# Patient Record
Sex: FEMALE | Race: BLACK OR AFRICAN AMERICAN | Employment: FULL TIME | URBAN - METROPOLITAN AREA
[De-identification: names, ages, dates, MRNs, and addresses within clinical notes are randomized per-mention and may not be internally consistent; named-entity substitution may affect disease eponyms.]

---

## 2017-12-11 ENCOUNTER — HOSPITAL ENCOUNTER (OUTPATIENT)
Dept: PHYSICAL THERAPY | Age: 42
Discharge: HOME | End: 2017-12-11
Attending: FAMILY MEDICINE
Payer: OTHER GOVERNMENT

## 2017-12-11 DIAGNOSIS — M54.50 CHRONIC RIGHT-SIDED LOW BACK PAIN WITHOUT SCIATICA: ICD-10-CM

## 2017-12-11 DIAGNOSIS — G89.29 CHRONIC RIGHT-SIDED LOW BACK PAIN WITHOUT SCIATICA: ICD-10-CM

## 2017-12-11 DIAGNOSIS — M25.551 RIGHT HIP PAIN: ICD-10-CM

## 2017-12-11 PROCEDURE — 97110 THERAPEUTIC EXERCISES: CPT

## 2017-12-11 PROCEDURE — 97014 ELECTRIC STIMULATION THERAPY: CPT

## 2017-12-11 PROCEDURE — 97161 PT EVAL LOW COMPLEX 20 MIN: CPT

## 2017-12-11 NOTE — THERAPY EVALUATION
Chicho Gossemann  : 1975   Payor: Nitish Torres / [place]  ACTIVE DUTY / Product Type:  /    [address]  at [facility]  [address], [address], [place]  [phone]   [phone]        OUTPATIENT PHYSICAL THERAPY:Initial Assessment 2017   ICD-10: Treatment Diagnosis: Low back pain (M54.5), Pain in left hip (M25.552), Pain in right hip (M25.551)  Precautions/Allergies:   Review of patient's allergies indicates no known allergies. Fall Risk Score: 0 (? 5 = High Risk)  MD Orders: evaluate and treat MEDICAL/REFERRING DIAGNOSIS:  Chronic right-sided low back pain without sciatica [M54.5, G89.29]  Right hip pain [M25.551]   DATE OF ONSET: --  REFERRING PHYSICIAN: DO MONIQUE Reyes PHYSICIAN APPOINTMENT: -     INITIAL ASSESSMENT:  Ms. Jose Manuel Yang presents with low back and hip pain. Pt will benefit from skilled physical therapy to address dysfunctions and pain. PROBLEM LIST (Impacting functional limitations):  1. Decreased ADL/Functional Activities  2. Increased Pain  3. Decreased [place] with Home Exercise Program INTERVENTIONS PLANNED:  1. Home Exercise Program (HEP)  2. Manual Therapy including joint and soft tissue manipulation and mobilization, and dry needling  3. Therapeutic Exercise/Strengthening  4. Modalities including heat/cold application and electrical stimulation   TREATMENT PLAN:  Effective Dates: 17 TO 18. Frequency/Duration: 1-2 times a week for 4 weeks  GOALS: (Goals have been discussed and agreed upon with patient.)  Short-Term Functional Goals: Time Frame: 4 weeks  1. Pt will be independent with > or = 3 exercises in HEP. Discharge Goals: Time Frame: 8 weeks  1. Pt will be independent with > or = 4 exercises in HEP. 2. Pt will report > or = \"moderately better\" on GROC.     Rehabilitation Potential For Stated Goals: EXCELLENT  Regarding Sonia Harrison's therapy, I certify that the treatment plan above will be carried out by a therapist or under their direction. Thank you for this referral,  Umair Grace, PT, DPT               The information in this section was collected on 12/11/17 (except where otherwise noted). HISTORY:   History of Present Injury/Illness (Reason for Referral):  Pt reports chronic low back pain and bilateral hip pain worse on the right that has been getting worse the last 2 months. Made worse by running, prolonged standing. Made better by stretching, massage. Has been diagnosed in the past with scoliosis. No recent radiographs. Past Medical History/Comorbidities:   Ms. Mirian Cano  has a past medical history of Acne; Gestational diabetes; Hypertension; Insomnia with sleep apnea; Low testosterone level in female; Menopausal syndrome; Mixed hyperlipidemia; and Vitamin D deficiency (11/05/2013). Ms. Mirian Cano  has no past surgical history on file. Social History/Living Environment:     house  Prior Level of Function/Work/Activity:   full time  Dominant Side:         RIGHT    Current Medications:       Current Outpatient Prescriptions:     progesterone micronized 4 % vaginal gel, Insert 45 mg into vagina every other day for 90 days. , Disp: 45 Tube, Rfl: 11    atorvastatin (LIPITOR) 10 mg tablet, Take 1 Tab by mouth daily. , Disp: 90 Tab, Rfl: 3    testosterone (TESTIM) 50 mg/5 gram (1 %) gel, 0.1 g by TransDERmal route daily. Max Daily Amount: 0.1 g., Disp: 30 g, Rfl: 5    cholecalciferol (VITAMIN D3) 1,000 unit cap, Take 1,000 Units by mouth daily. Indications: OTC, Disp: , Rfl:     flaxseed oil 1,000 mg cap, Take 1 Cap by mouth daily. Indications: OTC, Disp: , Rfl:    Date Last Reviewed:  12/11/2017     Number of Personal Factors/Comorbidities that affect the Plan of Care: 0: LOW COMPLEXITY   EXAMINATION:   Observation/Orthostatic Postural Assessment:          In standing patient has increased lumbar lordosis.    Palpation:          Increased tenderness to PAs of low back and posterior hips especially over the PSIS and the SI joints. ROM:          Grossly functional ROM. Decreased low back flexion movement. Slightly decreased R hip IR compared to L. Strength:          Decreased core strength        Hip abduction L 4/5, R 3+/5        Hip extension L 3/5, R 3/5       Hip flexion L 5/5, R 4/5  Balance:          No functional deficits noted  SFMA Top Tier (FN = Functional and Non-painful, FP = Functional and Painful, DP = Dysfunctional and Painful, DN = Dysfunctional and Non-painful)  Cervical flexion: FN  Cervical extension: FN   Cervical rotation: L FN, R FN  Upper Extremity Pattern 1 (extension, IR): L FN, R FN  Upper Extremity Pattern 2 (flexion, ER): L FN, R FN  Multi-segmental flexion: DP  Multi-segmental extension: DP  Multi-segmental rotation: L FP, R FP  Single-Leg Stance: L DN, R DN  Overhead Deep Squat: DN   Summary: Pain elicited with low back flexion and extension, most limited in these movements. Back rotation functional range but end range elicited pain. Body Structures Involved:  1. Nerves  2. Bones  3. Joints  4. Muscles Body Functions Affected:  1. Sensory/Pain  2. Neuromusculoskeletal  3. Movement Related Activities and Participation Affected:  1. General Tasks and Demands  2. Mobility  3. Community, Social and [place]   Number of elements (examined above) that affect the Plan of Care: 1-2: LOW COMPLEXITY   CLINICAL PRESENTATION:   Presentation: Stable and uncomplicated: LOW COMPLEXITY   CLINICAL DECISION MAKING:   Outcome Measure: Tool Used: Modified Oswestry Low Back Pain Questionnaire  Score:  Initial: 5/50  Most Recent: X/50 (Date: -- )   Interpretation of Score: Each section is scored on a 0-5 scale, 5 representing the greatest disability. The scores of each section are added together for a total score of 50.     Score 0 1-10 11-20 21-30 31-40 41-49 50   Modifier CH CI CJ CK CL CM CN     Medical Necessity:   · Skilled intervention continues to be required due to decreased function. Reason for Services/Other Comments:  · Patient continues to require skilled intervention due to decreased function. Use of outcome tool(s) and clinical judgement create a POC that gives a: Clear prediction of patient's progress: LOW COMPLEXITY            TREATMENT:   (In addition to Assessment/Re-Assessment sessions the following treatments were rendered)  Pre-treatment Symptoms/Complaints:  Low level dull ache in bilateral hips and low back  Pain: Initial:     3/10 Post Session:  0/10     THERAPEUTIC EXERCISE: (15 minutes):  Exercises per grid below to improve mobility and strength. Date:  12/11 Date:   Date:     Activity/Exercise Parameters Parameters Parameters         Piriformis stretch 20 sec hold x 3      SL hip abduction 2 x 10                                 MANUAL THERAPY: ( minutes): To increase motion and reduce pain    MODALITIES: (12 minutes):   - IFC electrical stimulation to low back/SI region, intensity adjusted to patient tolerance    Treatment/Session Assessment:    · Response to Treatment:  Pt reported reduction in pain to 0/10 after exercises, and reported the IFC felt very good. · Compliance with Program/Exercises: Will assess as treatment progresses. · Recommendations/Intent for next treatment session: \"Next visit will focus on advancements to more challenging activities\".   Total Treatment Duration:  PT Patient Time In/Time Out  Time In: 0915  Time Out: 1010    Future Appointments  Date Time Provider Zeeshan Carpenter   12/14/2017 9:45 AM Reuben Lundberg PT [facility]   12/18/2017 3:15 PM [facility]   12/20/2017 3:15 PM Leyla Gamino PT, DPT [facility]   12/27/2017 11:15 AM Leyla Gamino PT, DPT [facility]   12/29/2017 9:15 AM Leyla Gamino PT DPT [facility]   1/3/2018 8:30 AM Leyla Gamino PT DPMARIA M [facility]   1/5/2018 8:30 AM Leyla Gamino PT, DPT [facility]   1/15/2018 10:30 AM PST LAB [facility] PST PST   1/29/2018 9:10 AM DO ANGELICA Prado PST PST       Camden Valenzuela, PT, DPT

## 2017-12-11 NOTE — PROGRESS NOTES
Ambulatory/Rehab Services H2 Model Falls Risk Assessment    Risk Factor Pts. ·   Confusion/Disorientation/Impulsivity  []    4 ·   Symptomatic Depression  []   2 ·   Altered Elimination  []   1 ·   Dizziness/Vertigo  []   1 ·   Gender (Male)  []   1 ·   Any administered antiepileptics (anticonvulsants):  []   2 ·   Any administered benzodiazepines:  []   1 ·   Visual Impairment (specify):  []   1 ·   Portable Oxygen Use  []   1 ·   Orthostatic ? BP  []   1 ·   History of Recent Falls (within 3 mos.)  []   5     Ability to Rise from Chair (choose one) Pts. ·   Ability to rise in a single movement  []   0 ·   Pushes up, successful in one attempt  []   1 ·   Multiple attempts, but successful  []   3 ·   Unable to rise without assistance  []   4   Total: (5 or greater = High Risk) 0     Falls Prevention Plan:   []                Physical Limitations to Exercise (specify):   []                Mobility Assistance Device (type):   []                Exercise/Equipment Adaptation (specify):    ©2010 [facility] [place] Patent #[id number].  Federal Law prohibits the replication, distribution or use without written permission from [facility] [employer]

## 2017-12-14 ENCOUNTER — HOSPITAL ENCOUNTER (OUTPATIENT)
Dept: PHYSICAL THERAPY | Age: 42
Discharge: HOME | End: 2017-12-14
Attending: FAMILY MEDICINE
Payer: OTHER GOVERNMENT

## 2017-12-14 PROCEDURE — 97014 ELECTRIC STIMULATION THERAPY: CPT

## 2017-12-14 PROCEDURE — 97110 THERAPEUTIC EXERCISES: CPT

## 2017-12-14 NOTE — PROGRESS NOTES
Maxwell Mckinney  : 1975   Payor: Kobe Julien / [place]  ACTIVE DUTY / Product Type:  /    [address]  at [address]  [address], [address], [place] [address]  [phone]   [phone]        OUTPATIENT PHYSICAL THERAPY:Daily Note 2017   ICD-10: Treatment Diagnosis: Low back pain (M54.5), Pain in left hip (M25.552), Pain in right hip (M25.551)  Precautions/Allergies:   Review of patient's allergies indicates no known allergies. Fall Risk Score: 0 (? 5 = High Risk)  MD Orders: evaluate and treat MEDICAL/REFERRING DIAGNOSIS:  Right Hip pain and low back pain    DATE OF ONSET: --  REFERRING PHYSICIAN: Chikis Aldridge DO  RETURN PHYSICIAN APPOINTMENT: -     ASSESSMENT:  Ms. Vandana King presents with low back and hip pain. Pt will benefit from skilled physical therapy to address dysfunctions and pain. PROBLEM LIST (Impacting functional limitations):  1. Decreased ADL/Functional Activities  2. Increased Pain  3. Decreased [place] with Home Exercise Program INTERVENTIONS PLANNED:  1. Home Exercise Program (HEP)  2. Manual Therapy including joint and soft tissue manipulation and mobilization, and dry needling  3. Therapeutic Exercise/Strengthening  4. Modalities including heat/cold application and electrical stimulation   TREATMENT PLAN:  Effective Dates: 17 TO 18. Frequency/Duration: 1-2 times a week for 4 weeks  GOALS: (Goals have been discussed and agreed upon with patient.)  Short-Term Functional Goals: Time Frame: 4 weeks  1. Pt will be independent with > or = 3 exercises in HEP. Discharge Goals: Time Frame: 8 weeks  1. Pt will be independent with > or = 4 exercises in HEP. 2. Pt will report > or = \"moderately better\" on GROC. Rehabilitation Potential For Stated Goals: EXCELLENT              The information in this section was collected on 17 (except where otherwise noted).   HISTORY:   History of Present Injury/Illness (Reason for Referral):  Pt reports chronic low back pain and bilateral hip pain worse on the right that has been getting worse the last 2 months. Made worse by running, prolonged standing. Made better by stretching, massage. Has been diagnosed in the past with scoliosis. No recent radiographs. Past Medical History/Comorbidities:   Ms. Meghana Iverson  has a past medical history of Acne; Gestational diabetes; Hypertension; Insomnia with sleep apnea; Low testosterone level in female; Menopausal syndrome; Mixed hyperlipidemia; and Vitamin D deficiency (11/05/2013). Ms. Meghana Iverson  has no past surgical history on file. Social History/Living Environment:     house  Prior Level of Function/Work/Activity:   full time  Dominant Side:         RIGHT    Current Medications:       Current Outpatient Prescriptions:     progesterone micronized 4 % vaginal gel, Insert 45 mg into vagina every other day for 90 days. , Disp: 45 Tube, Rfl: 11    atorvastatin (LIPITOR) 10 mg tablet, Take 1 Tab by mouth daily. , Disp: 90 Tab, Rfl: 3    testosterone (TESTIM) 50 mg/5 gram (1 %) gel, 0.1 g by TransDERmal route daily. Max Daily Amount: 0.1 g., Disp: 30 g, Rfl: 5    cholecalciferol (VITAMIN D3) 1,000 unit cap, Take 1,000 Units by mouth daily. Indications: OTC, Disp: , Rfl:     flaxseed oil 1,000 mg cap, Take 1 Cap by mouth daily. Indications: OTC, Disp: , Rfl:    Date Last Reviewed:  12/14/2017     EXAMINATION:   Observation/Orthostatic Postural Assessment:          In standing patient has increased lumbar lordosis. Palpation:          Increased tenderness to PAs of low back and posterior hips especially over the PSIS and the SI joints. ROM:          Grossly functional ROM. Decreased low back flexion movement. Slightly decreased R hip IR compared to L.    Strength:          Decreased core strength        Hip abduction L 4/5, R 3+/5        Hip extension L 3/5, R 3/5       Hip flexion L 5/5, R 4/5  Balance:          No functional deficits noted  [facility] Top Tier (FN = Functional and Non-painful, FP = Functional and Painful, DP = Dysfunctional and Painful, DN = Dysfunctional and Non-painful)  Cervical flexion: FN  Cervical extension: FN   Cervical rotation: L FN, R FN  Upper Extremity Pattern 1 (extension, IR): L FN, R FN  Upper Extremity Pattern 2 (flexion, ER): L FN, R FN  Multi-segmental flexion: DP  Multi-segmental extension: DP  Multi-segmental rotation: L FP, R FP  Single-Leg Stance: L DN, R DN  Overhead Deep Squat: DN   Summary: Pain elicited with low back flexion and extension, most limited in these movements. Back rotation functional range but end range elicited pain. Body Structures Involved:  1. Nerves  2. Bones  3. Joints  4. Muscles Body Functions Affected:  1. Sensory/Pain  2. Neuromusculoskeletal  3. Movement Related Activities and Participation Affected:  1. General Tasks and Demands  2. Mobility  3. Community, Social and Civic Life   CLINICAL PRESENTATION:   CLINICAL DECISION MAKING:   Outcome Measure: Tool Used: Modified Oswestry Low Back Pain Questionnaire  Score:  Initial: 5/50  Most Recent: X/50 (Date: -- )   Interpretation of Score: Each section is scored on a 0-5 scale, 5 representing the greatest disability. The scores of each section are added together for a total score of 50. Score 0 1-10 11-20 21-30 31-40 41-49 50   Modifier CH CI CJ CK CL CM CN     Medical Necessity:   · Skilled intervention continues to be required due to decreased function. Reason for Services/Other Comments:  · Patient continues to require skilled intervention due to decreased function.             TREATMENT:   (In addition to Assessment/Re-Assessment sessions the following treatments were rendered)  Pre-treatment Symptoms/Complaints:  Low level dull ache in bilateral hips and low back  Pain: Initial:     2-3/10 Post Session:  0/10     THERAPEUTIC EXERCISE: (35 minutes):  Exercises per grid below to improve mobility and strength. Date:  12/11 Date:  12/14/17 Date:     Activity/Exercise Parameters Parameters Parameters   NuStep  10 mins, 2.0    Piriformis stretch 20 sec hold x 3      SL hip abduction 2 x 10 Clamshells RTB    Squats      LTR      TA contraction  15x 5 sec hold    TA march      Bridge  RTB 20x     SLR      KTC  10x10 each side    OKTC  10x10 each side    Cat camel  15x using core    Bird dog      Prayer stretch  10x     Table top lift  15x using core to lift knees in quadruped        MANUAL THERAPY: ( minutes): To increase motion and reduce pain    MODALITIES: (15 minutes):   - IFC electrical stimulation to low back/SI region, intensity adjusted to patient tolerance w/ heat to LB    Treatment/Session Assessment:    · Response to Treatment:  Pt reported reduction in pain to 1/10 after exercises and 0/10 after modalities. She was challenged w/ core exercises and will benefit from continued strengthening efforts. · Compliance with Program/Exercises: Will assess as treatment progresses. · Recommendations/Intent for next treatment session: \"Next visit will focus on advancements to more challenging activities\".   Total Treatment Duration:  PT Patient Time In/Time Out  Time In: 0945  Time Out: 6142    Future Appointments  Date Time Provider Zeeshan Carpenter   12/18/2017 3:15 PM EMMA [facility]   12/20/2017 3:15 PM Florecita Celis PT, ALEX [facility]   12/27/2017 11:15 AM Florecita Celis PT, DPT [facility]   12/29/2017 9:15 [facility]   1/3/2018 8:30 AM Florecita Celis, PT, DPT [facility]   1/5/2018 8:30 AM Florecita Celis, PT, DPT [facility]   1/15/2018 10:30 AM PST LAB [facility] PST PST   1/29/2018 9:10 AM [place] , DO ANGELICA Lugo PT

## 2017-12-18 ENCOUNTER — HOSPITAL ENCOUNTER (OUTPATIENT)
Dept: PHYSICAL THERAPY | Age: 42
Discharge: HOME | End: 2017-12-18
Attending: FAMILY MEDICINE
Payer: OTHER GOVERNMENT

## 2017-12-18 PROCEDURE — 97014 ELECTRIC STIMULATION THERAPY: CPT

## 2017-12-18 PROCEDURE — 97110 THERAPEUTIC EXERCISES: CPT

## 2017-12-18 NOTE — PROGRESS NOTES
Jazmyn Jung  : 1975   Payor: Dimitri Mora / [place]  ACTIVE DUTY / Product Type:  /    [address]  at [address]  [address], [address], [place]  [phone]   [phone]        OUTPATIENT PHYSICAL THERAPY:Daily Note 2017   ICD-10: Treatment Diagnosis: Low back pain (M54.5), Pain in left hip (M25.552), Pain in right hip (M25.551)  Precautions/Allergies:   Review of patient's allergies indicates no known allergies. Fall Risk Score: 0 (? 5 = High Risk)  MD Orders: evaluate and treat MEDICAL/REFERRING DIAGNOSIS:  Right Hip pain and low back pain    DATE OF ONSET: --  REFERRING PHYSICIAN: Mara Thomas DO  RETURN PHYSICIAN APPOINTMENT: -     ASSESSMENT:  Ms. Lary Jama presents with low back and hip pain. Pt will benefit from skilled physical therapy to address dysfunctions and pain. PROBLEM LIST (Impacting functional limitations):  1. Decreased ADL/Functional Activities  2. Increased Pain  3. Decreased [place] with Home Exercise Program INTERVENTIONS PLANNED:  1. Home Exercise Program (HEP)  2. Manual Therapy including joint and soft tissue manipulation and mobilization, and dry needling  3. Therapeutic Exercise/Strengthening  4. Modalities including heat/cold application and electrical stimulation   TREATMENT PLAN:  Effective Dates: 17 TO 18. Frequency/Duration: 1-2 times a week for 4 weeks  GOALS: (Goals have been discussed and agreed upon with patient.)  Short-Term Functional Goals: Time Frame: 4 weeks  1. Pt will be independent with > or = 3 exercises in HEP. Discharge Goals: Time Frame: 8 weeks  1. Pt will be independent with > or = 4 exercises in HEP. 2. Pt will report > or = \"moderately better\" on GROC. Rehabilitation Potential For Stated Goals: EXCELLENT              The information in this section was collected on 17 (except where otherwise noted).   HISTORY:   History of Present Injury/Illness (Reason for Referral):  Pt reports chronic low back pain and bilateral hip pain worse on the right that has been getting worse the last 2 months. Made worse by running, prolonged standing. Made better by stretching, massage. Has been diagnosed in the past with scoliosis. No recent radiographs. Past Medical History/Comorbidities:   Ms. Richard Chan  has a past medical history of Acne; Gestational diabetes; Hypertension; Insomnia with sleep apnea; Low testosterone level in female; Menopausal syndrome; Mixed hyperlipidemia; and Vitamin D deficiency (11/05/2013). Ms. Richard Chan  has no past surgical history on file. Social History/Living Environment:     house  Prior Level of Function/Work/Activity:   full time  Dominant Side:         RIGHT    Current Medications:       Current Outpatient Prescriptions:     progesterone micronized 4 % vaginal gel, Insert 45 mg into vagina every other day for 90 days. , Disp: 45 Tube, Rfl: 11    atorvastatin (LIPITOR) 10 mg tablet, Take 1 Tab by mouth daily. , Disp: 90 Tab, Rfl: 3    testosterone (TESTIM) 50 mg/5 gram (1 %) gel, 0.1 g by TransDERmal route daily. Max Daily Amount: 0.1 g., Disp: 30 g, Rfl: 5    cholecalciferol (VITAMIN D3) 1,000 unit cap, Take 1,000 Units by mouth daily. Indications: OTC, Disp: , Rfl:     flaxseed oil 1,000 mg cap, Take 1 Cap by mouth daily. Indications: OTC, Disp: , Rfl:    Date Last Reviewed:  12/18/2017     EXAMINATION:   Observation/Orthostatic Postural Assessment:          In standing patient has increased lumbar lordosis. Palpation:          Increased tenderness to PAs of low back and posterior hips especially over the PSIS and the SI joints. ROM:          Grossly functional ROM. Decreased low back flexion movement. Slightly decreased R hip IR compared to L.    Strength:          Decreased core strength        Hip abduction L 4/5, R 3+/5        Hip extension L 3/5, R 3/5       Hip flexion L 5/5, R 4/5  Balance:          No functional deficits noted  [facility] Top Tier (FN = Functional and Non-painful, FP = Functional and Painful, DP = Dysfunctional and Painful, DN = Dysfunctional and Non-painful)  Cervical flexion: FN  Cervical extension: FN   Cervical rotation: L FN, R FN  Upper Extremity Pattern 1 (extension, IR): L FN, R FN  Upper Extremity Pattern 2 (flexion, ER): L FN, R FN  Multi-segmental flexion: DP  Multi-segmental extension: DP  Multi-segmental rotation: L FP, R FP  Single-Leg Stance: L DN, R DN  Overhead Deep Squat: DN   Summary: Pain elicited with low back flexion and extension, most limited in these movements. Back rotation functional range but end range elicited pain. Body Structures Involved:  1. Nerves  2. Bones  3. Joints  4. Muscles Body Functions Affected:  1. Sensory/Pain  2. Neuromusculoskeletal  3. Movement Related Activities and Participation Affected:  1. General Tasks and Demands  2. Mobility  3. Community, Social and Civic Life   CLINICAL PRESENTATION:   CLINICAL DECISION MAKING:   Outcome Measure: Tool Used: Modified Oswestry Low Back Pain Questionnaire  Score:  Initial: 5/50  Most Recent: X/50 (Date: -- )   Interpretation of Score: Each section is scored on a 0-5 scale, 5 representing the greatest disability. The scores of each section are added together for a total score of 50. Score 0 1-10 11-20 21-30 31-40 41-49 50   Modifier CH CI CJ CK CL CM CN     Medical Necessity:   · Skilled intervention continues to be required due to decreased function. Reason for Services/Other Comments:  · Patient continues to require skilled intervention due to decreased function. TREATMENT:   (In addition to Assessment/Re-Assessment sessions the following treatments were rendered)  Pre-treatment Symptoms/Complaints:  Pt notes she felt pretty good until her son jumped on her back.    Pain: Initial:     5/10 Post Session:  0/10     THERAPEUTIC EXERCISE: (30 minutes):  Exercises per grid below to improve mobility and strength. Date:  12/11 Date:  12/14/17 Date:  12/18/17   Activity/Exercise Parameters Parameters Parameters   NuStep  10 mins, 2.0 10 mins, 2.0   Piriformis stretch 20 sec hold x 3      SL hip abduction 2 x 10 Clamshells RTB    Squats      LTR      TA contraction  15x 5 sec hold 2x10 5 sec hold   TA march      Bridge  RTB 20x     SLR      KTC  10x10 each side 10x10 each side   OKTC  10x10 each side 10x10 each side   Cat camel  15x using core 15x using core   Bird dog      Prayer stretch  10x  10x   Table top lift  15x using core to lift knees in quadruped 10x using core to lift knees in quadruped   DKTC   10x10       MANUAL THERAPY: ( minutes): To increase motion and reduce pain    MODALITIES: (15 minutes):   - IFC electrical stimulation to low back/SI region, intensity adjusted to patient tolerance w/ heat to LB    Treatment/Session Assessment:    · Response to Treatment:  Pt reported reduction in pain to 2.5/10 after exercises and 0/10 after modalities. She was challenged w/ core exercises and will benefit from continued strengthening efforts. · Compliance with Program/Exercises: Will assess as treatment progresses. · Recommendations/Intent for next treatment session: \"Next visit will focus on advancements to more challenging activities\".   Total Treatment Duration:  PT Patient Time In/Time Out  Time In: 9256  Time Out: 2273 [address]    Future Appointments  Date Time Provider Zeeshan Carpenter   12/20/2017 3:15 PM Domingo Coil, PT, DPT [facility]   12/27/2017 11:15 AM Parine Coil, PT, DPT [facility]   12/29/2017 9:15 AM Marilyne Coil, PT, DPT [facility]   1/3/2018 8:30 AM Marilyne Coil, PT, DPT [facility]   1/5/2018 8:30 AM Parine Coil, PT, DPT [facility]   1/15/2018 10:30 AM PST LAB [facility] PST PST   1/29/2018 9:10 AM Jesus Roque,  [facility] PST PST       Yennifer Sharma, PT

## 2017-12-27 ENCOUNTER — HOSPITAL ENCOUNTER (OUTPATIENT)
Dept: PHYSICAL THERAPY | Age: 42
Discharge: HOME | End: 2017-12-27
Attending: FAMILY MEDICINE
Payer: OTHER GOVERNMENT

## 2017-12-27 PROCEDURE — 97110 THERAPEUTIC EXERCISES: CPT

## 2017-12-27 NOTE — PROGRESS NOTES
Karely Mala  : 1975   Payor: Elijah Prescott / [place]  ACTIVE DUTY / Product Type:  /    [address]  at [facility]  [address], [address], [place]  [phone]   [phone]        OUTPATIENT PHYSICAL THERAPY:Daily Note 2017   ICD-10: Treatment Diagnosis: Low back pain (M54.5), Pain in left hip (M25.552), Pain in right hip (M25.551)  Precautions/Allergies:   Review of patient's allergies indicates no known allergies. Fall Risk Score: 0 (? 5 = High Risk)  MD Orders: evaluate and treat MEDICAL/REFERRING DIAGNOSIS:  Right Hip pain and low back pain    DATE OF ONSET: --  REFERRING PHYSICIAN: Franca Duarte DO  RETURN PHYSICIAN APPOINTMENT: -     ASSESSMENT:  Ms. Stephany Avery presents with low back and hip pain. Pt will benefit from skilled physical therapy to address dysfunctions and pain. PROBLEM LIST (Impacting functional limitations):  1. Decreased ADL/Functional Activities  2. Increased Pain  3. Decreased [place] with Home Exercise Program INTERVENTIONS PLANNED:  1. Home Exercise Program (HEP)  2. Manual Therapy including joint and soft tissue manipulation and mobilization, and dry needling  3. Therapeutic Exercise/Strengthening  4. Modalities including heat/cold application and electrical stimulation   TREATMENT PLAN:  Effective Dates: 17 TO 18. Frequency/Duration: 1-2 times a week for 4 weeks  GOALS: (Goals have been discussed and agreed upon with patient.)  Short-Term Functional Goals: Time Frame: 4 weeks  1. Pt will be independent with > or = 3 exercises in HEP. Discharge Goals: Time Frame: 8 weeks  1. Pt will be independent with > or = 4 exercises in HEP. 2. Pt will report > or = \"moderately better\" on GROC. Rehabilitation Potential For Stated Goals: EXCELLENT              The information in this section was collected on 17 (except where otherwise noted).   HISTORY:   History of Present Injury/Illness (Reason for Referral):  Pt reports chronic low back pain and bilateral hip pain worse on the right that has been getting worse the last 2 months. Made worse by running, prolonged standing. Made better by stretching, massage. Has been diagnosed in the past with scoliosis. No recent radiographs. Past Medical History/Comorbidities:   Ms. Edward Barbour  has a past medical history of Acne; Gestational diabetes; Hypertension; Insomnia with sleep apnea; Low testosterone level in female; Menopausal syndrome; Mixed hyperlipidemia; and Vitamin D deficiency (11/05/2013). Ms. Edward Barbour  has no past surgical history on file. Social History/Living Environment:     house  Prior Level of Function/Work/Activity:   full time  Dominant Side:         RIGHT    Current Medications:       Current Outpatient Prescriptions:     progesterone micronized 4 % vaginal gel, Insert 45 mg into vagina every other day for 90 days. , Disp: 45 Tube, Rfl: 11    atorvastatin (LIPITOR) 10 mg tablet, Take 1 Tab by mouth daily. , Disp: 90 Tab, Rfl: 3    testosterone (TESTIM) 50 mg/5 gram (1 %) gel, 0.1 g by TransDERmal route daily. Max Daily Amount: 0.1 g., Disp: 30 g, Rfl: 5    cholecalciferol (VITAMIN D3) 1,000 unit cap, Take 1,000 Units by mouth daily. Indications: OTC, Disp: , Rfl:     flaxseed oil 1,000 mg cap, Take 1 Cap by mouth daily. Indications: OTC, Disp: , Rfl:    Date Last Reviewed:  12/27/2017     EXAMINATION:   Observation/Orthostatic Postural Assessment:          In standing patient has increased lumbar lordosis. Palpation:          Increased tenderness to PAs of low back and posterior hips especially over the PSIS and the SI joints. ROM:          Grossly functional ROM. Decreased low back flexion movement. Slightly decreased R hip IR compared to L.    Strength:          Decreased core strength        Hip abduction L 4/5, R 3+/5        Hip extension L 3/5, R 3/5       Hip flexion L 5/5, R 4/5  Balance:          No functional deficits noted  [facility] Top Tier (FN = Functional and Non-painful, FP = Functional and Painful, DP = Dysfunctional and Painful, DN = Dysfunctional and Non-painful)  Cervical flexion: FN  Cervical extension: FN   Cervical rotation: L FN, R FN  Upper Extremity Pattern 1 (extension, IR): L FN, R FN  Upper Extremity Pattern 2 (flexion, ER): L FN, R FN  Multi-segmental flexion: DP  Multi-segmental extension: DP  Multi-segmental rotation: L FP, R FP  Single-Leg Stance: L DN, R DN  Overhead Deep Squat: DN   Summary: Pain elicited with low back flexion and extension, most limited in these movements. Back rotation functional range but end range elicited pain. Body Structures Involved:  1. Nerves  2. Bones  3. Joints  4. Muscles Body Functions Affected:  1. Sensory/Pain  2. Neuromusculoskeletal  3. Movement Related Activities and Participation Affected:  1. General Tasks and Demands  2. Mobility  3. Community, Social and Civic Life   CLINICAL PRESENTATION:   CLINICAL DECISION MAKING:   Outcome Measure: Tool Used: Modified Oswestry Low Back Pain Questionnaire  Score:  Initial: 5/50  Most Recent: X/50 (Date: -- )   Interpretation of Score: Each section is scored on a 0-5 scale, 5 representing the greatest disability. The scores of each section are added together for a total score of 50. Score 0 1-10 11-20 21-30 31-40 41-49 50   Modifier CH CI CJ CK CL CM CN     Medical Necessity:   · Skilled intervention continues to be required due to decreased function. Reason for Services/Other Comments:  · Patient continues to require skilled intervention due to decreased function.             TREATMENT:   (In addition to Assessment/Re-Assessment sessions the following treatments were rendered)  Pre-treatment Symptoms/Complaints:  Pt reports no back pain today, hasn't been at work this week and back has been able to rest.   Pain: Initial:     0/10 Post Session:  0/10     THERAPEUTIC EXERCISE: (45 minutes):  Exercises per grid below to improve mobility and strength. Date:  12/11 Date:  12/14/17 Date:  12/18/17 12/27   Activity/Exercise Parameters Parameters Parameters    NuStep  10 mins, 2.0 10 mins, 2.0 10 min 3.0   Piriformis stretch 20 sec hold x 3       SL hip abduction 2 x 10 Clamshells RTB     Squats       LTR       TA contraction  15x 5 sec hold 2x10 5 sec hold    TA march       Bridge  RTB 20x      SLR       KTC  10x10 each side 10x10 each side 10 x 10 ea   OKTC  10x10 each side 10x10 each side 10 x 10 ea   Abdominal isometrics    10 sec hold x 5 ea   Open book    X 10 ea   Cat camel  15x using core 15x using core X 20   Quadruped opposites    X 20       X 10   Bird dog       Prayer stretch  10x  10x 30 sec hold x 3, bias with stretch on L side   Table top lift  15x using core to lift knees in quadruped 10x using core to lift knees in quadruped 10x   DKTC   10x10    Seated pelvic tilt    X 20   Seated lumbar flexion stretch    X 20   Seated self-traction    X 5   Long axis traction    Therapist assist x 10       MANUAL THERAPY: ( minutes): To increase motion and reduce pain    MODALITIES: ( minutes):       Treatment/Session Assessment:    · Response to Treatment:  Pt reported increased soreness in low back with exercises, but no increased pain. · Compliance with Program/Exercises: Will assess as treatment progresses. · Recommendations/Intent for next treatment session: \"Next visit will focus on advancements to more challenging activities\".   Total Treatment Duration:  PT Patient Time In/Time Out  Time In: 1115  Time Out: 1200    Future Appointments  Date Time Provider Zeeshan Carpenter   12/29/2017 9:15 AM Dario Edouard PT, DPT [facility]   1/3/2018 8:30 AM Dario Edouard PT, DPT [facility]   1/5/2018 8:30 AM Dario Edouard PT, DPT [facility]   1/15/2018 10:30 AM PST LAB [facility] PST PST   1/29/2018 9:10 AM Sanaz Rolon,  [facility] PST PST       Dario Edouard, PT, DPT

## 2017-12-29 ENCOUNTER — HOSPITAL ENCOUNTER (OUTPATIENT)
Dept: PHYSICAL THERAPY | Age: 42
Discharge: HOME | End: 2017-12-29
Attending: FAMILY MEDICINE
Payer: OTHER GOVERNMENT

## 2017-12-29 PROCEDURE — 97110 THERAPEUTIC EXERCISES: CPT

## 2017-12-29 NOTE — PROGRESS NOTES
Ana Joyce  : 1975   Payor: Erin Gama / [place]  ACTIVE DUTY / Product Type:  /    [address]  at [facility]  [address], [address], [place]  [phone]   [phone]        OUTPATIENT PHYSICAL THERAPY:Daily Note 2017   ICD-10: Treatment Diagnosis: Low back pain (M54.5), Pain in left hip (M25.552), Pain in right hip (M25.551)  Precautions/Allergies:   Review of patient's allergies indicates no known allergies. Fall Risk Score: 0 (? 5 = High Risk)  MD Orders: evaluate and treat MEDICAL/REFERRING DIAGNOSIS:  Right Hip pain and low back pain    DATE OF ONSET: --  REFERRING PHYSICIAN: Keneth Boxer, DO RETURN PHYSICIAN APPOINTMENT: -     ASSESSMENT:  Ms. Alonso Meade presents with low back and hip pain. Pt will benefit from skilled physical therapy to address dysfunctions and pain. PROBLEM LIST (Impacting functional limitations):  1. Decreased ADL/Functional Activities  2. Increased Pain  3. Decreased [place] with Home Exercise Program INTERVENTIONS PLANNED:  1. Home Exercise Program (HEP)  2. Manual Therapy including joint and soft tissue manipulation and mobilization, and dry needling  3. Therapeutic Exercise/Strengthening  4. Modalities including heat/cold application and electrical stimulation   TREATMENT PLAN:  Effective Dates: 17 TO 18. Frequency/Duration: 1-2 times a week for 4 weeks  GOALS: (Goals have been discussed and agreed upon with patient.)  Short-Term Functional Goals: Time Frame: 4 weeks  1. Pt will be independent with > or = 3 exercises in HEP. Discharge Goals: Time Frame: 8 weeks  1. Pt will be independent with > or = 4 exercises in HEP. 2. Pt will report > or = \"moderately better\" on GROC. Rehabilitation Potential For Stated Goals: EXCELLENT              The information in this section was collected on 17 (except where otherwise noted).   HISTORY:   History of Present Injury/Illness (Reason for Referral):  Pt reports chronic low back pain and bilateral hip pain worse on the right that has been getting worse the last 2 months. Made worse by running, prolonged standing. Made better by stretching, massage. Has been diagnosed in the past with scoliosis. No recent radiographs. Past Medical History/Comorbidities:   Ms. Ajith Mathur  has a past medical history of Acne; Gestational diabetes; Hypertension; Insomnia with sleep apnea; Low testosterone level in female; Menopausal syndrome; Mixed hyperlipidemia; and Vitamin D deficiency (11/05/2013). Ms. Ajith Mathur  has no past surgical history on file. Social History/Living Environment:     house  Prior Level of Function/Work/Activity:   full time  Dominant Side:         RIGHT    Current Medications:       Current Outpatient Prescriptions:     progesterone micronized 4 % vaginal gel, Insert 45 mg into vagina every other day for 90 days. , Disp: 45 Tube, Rfl: 11    atorvastatin (LIPITOR) 10 mg tablet, Take 1 Tab by mouth daily. , Disp: 90 Tab, Rfl: 3    testosterone (TESTIM) 50 mg/5 gram (1 %) gel, 0.1 g by TransDERmal route daily. Max Daily Amount: 0.1 g., Disp: 30 g, Rfl: 5    cholecalciferol (VITAMIN D3) 1,000 unit cap, Take 1,000 Units by mouth daily. Indications: OTC, Disp: , Rfl:     flaxseed oil 1,000 mg cap, Take 1 Cap by mouth daily. Indications: OTC, Disp: , Rfl:    Date Last Reviewed:  12/29/2017     EXAMINATION:   Observation/Orthostatic Postural Assessment:          In standing patient has increased lumbar lordosis. Palpation:          Increased tenderness to PAs of low back and posterior hips especially over the PSIS and the SI joints. ROM:          Grossly functional ROM. Decreased low back flexion movement. Slightly decreased R hip IR compared to L.    Strength:          Decreased core strength        Hip abduction L 4/5, R 3+/5        Hip extension L 3/5, R 3/5       Hip flexion L 5/5, R 4/5  Balance:          No functional deficits noted  [facility] Top Tier (FN = Functional and Non-painful, FP = Functional and Painful, DP = Dysfunctional and Painful, DN = Dysfunctional and Non-painful)  Cervical flexion: FN  Cervical extension: FN   Cervical rotation: L FN, R FN  Upper Extremity Pattern 1 (extension, IR): L FN, R FN  Upper Extremity Pattern 2 (flexion, ER): L FN, R FN  Multi-segmental flexion: DP  Multi-segmental extension: DP  Multi-segmental rotation: L FP, R FP  Single-Leg Stance: L DN, R DN  Overhead Deep Squat: DN   Summary: Pain elicited with low back flexion and extension, most limited in these movements. Back rotation functional range but end range elicited pain. Body Structures Involved:  1. Nerves  2. Bones  3. Joints  4. Muscles Body Functions Affected:  1. Sensory/Pain  2. Neuromusculoskeletal  3. Movement Related Activities and Participation Affected:  1. General Tasks and Demands  2. Mobility  3. Community, Social and Civic Life   CLINICAL PRESENTATION:   CLINICAL DECISION MAKING:   Outcome Measure: Tool Used: Modified Oswestry Low Back Pain Questionnaire  Score:  Initial: 5/50  Most Recent: X/50 (Date: -- )   Interpretation of Score: Each section is scored on a 0-5 scale, 5 representing the greatest disability. The scores of each section are added together for a total score of 50. Score 0 1-10 11-20 21-30 31-40 41-49 50   Modifier CH CI CJ CK CL CM CN     Medical Necessity:   · Skilled intervention continues to be required due to decreased function. Reason for Services/Other Comments:  · Patient continues to require skilled intervention due to decreased function. TREATMENT:   (In addition to Assessment/Re-Assessment sessions the following treatments were rendered)  Pre-treatment Symptoms/Complaints:  Pt reports no back pain today. Pain: Initial:     0/10 Post Session:  0/10     THERAPEUTIC EXERCISE: (45 minutes):  Exercises per grid below to improve mobility and strength.     Date:  12/11 Date:  12/14/17 Date:  12/18/17 12/27 12/29   Activity/Exercise Parameters Parameters Parameters     NuStep  10 mins, 2.0 10 mins, 2.0 10 min 3.0 10 min level 3   Piriformis stretch 20 sec hold x 3        SL hip abduction 2 x 10 Clamshells RTB      Squats        LTR        TA contraction  15x 5 sec hold 2x10 5 sec hold     TA march        Bridge  RTB 20x       SLR        KTC  10x10 each side 10x10 each side 10 x 10 ea 10 x 10 ea   OKTC  10x10 each side 10x10 each side 10 x 10 ea 10 x 10 ea   Abdominal isometrics    10 sec hold x 5 ea 10 sec hold x 8 ea   Open book    X 10 ea X 10   Cat camel  15x using core 15x using core X 20 X 20   Quadruped opposites    X 20 X 10        X 10    Bird dog        Prayer stretch  10x  10x 30 sec hold x 3, bias with stretch on L side 30 sec hold x 3, bias with stretch on L side   Table top lift  15x using core to lift knees in quadruped 10x using core to lift knees in quadruped 10x    DKTC   10x10     Seated pelvic tilt    X 20 X 20   Seated lumbar flexion stretch    X 20 X 20   Seated self-traction    X 5 X 3   Long axis traction    Therapist assist x 10 Therapist assist x 10   Prone on table, LEs hanging off, lumbar extension     X 10       MANUAL THERAPY: ( minutes): To increase motion and reduce pain    MODALITIES: ( minutes):       Treatment/Session Assessment:    · Response to Treatment:  Pt reported increased soreness in low back with quadruped opposites, decreased with prayer stretch. Added higher level low back strengthening, no pain reported. Returns to work next Wednesday. Pt expecting some regression with prolonged sitting at work. · Compliance with Program/Exercises: Will assess as treatment progresses. · Recommendations/Intent for next treatment session: \"Next visit will focus on advancements to more challenging activities\".   Total Treatment Duration:       Future Appointments  Date Time Provider Zeeshan Blanchardi   1/3/2018 8:30 AM Bhavesh Coyle, PT, DPT [facility]   1/5/2018 8:30 AM Bhavesh Coyle, PT, DPT [facility]   1/15/2018 10:30 AM PST LAB [facility] PST PST   1/29/2018 9:10 AM DO ANGELICA Patrick, PT, DPT

## 2018-01-03 ENCOUNTER — HOSPITAL ENCOUNTER (OUTPATIENT)
Dept: PHYSICAL THERAPY | Age: 43
Discharge: HOME | End: 2018-01-03
Attending: FAMILY MEDICINE
Payer: OTHER GOVERNMENT

## 2018-01-03 PROCEDURE — 97110 THERAPEUTIC EXERCISES: CPT

## 2018-01-03 NOTE — PROGRESS NOTES
Claude Grantle  : 1975   Payor: Steven Monique / Plan: [place]  ACTIVE DUTY / Product Type:  /    [address]  at [facility]  [address], [address], [place]  [phone]   [phone]        OUTPATIENT PHYSICAL THERAPY:Daily Note 1/3/2018   ICD-10: Treatment Diagnosis: Low back pain (M54.5), Pain in left hip (M25.552), Pain in right hip (M25.551)  Precautions/Allergies:   Review of patient's allergies indicates no known allergies. Fall Risk Score: 0 (? 5 = High Risk)  MD Orders: evaluate and treat MEDICAL/REFERRING DIAGNOSIS:  Right Hip pain and low back pain    DATE OF ONSET: --  REFERRING PHYSICIAN: Wilbert Roman DO  RETURN PHYSICIAN APPOINTMENT: -     ASSESSMENT:  Ms. Royal Melendez presents with low back and hip pain. Pt will benefit from skilled physical therapy to address dysfunctions and pain. PROBLEM LIST (Impacting functional limitations):  1. Decreased ADL/Functional Activities  2. Increased Pain  3. Decreased [place] with Home Exercise Program INTERVENTIONS PLANNED:  1. Home Exercise Program (HEP)  2. Manual Therapy including joint and soft tissue manipulation and mobilization, and dry needling  3. Therapeutic Exercise/Strengthening  4. Modalities including heat/cold application and electrical stimulation   TREATMENT PLAN:  Effective Dates: 17 TO 18. Frequency/Duration: 1-2 times a week for 4 weeks  GOALS: (Goals have been discussed and agreed upon with patient.)  Short-Term Functional Goals: Time Frame: 4 weeks  1. Pt will be independent with > or = 3 exercises in HEP. Discharge Goals: Time Frame: 8 weeks  1. Pt will be independent with > or = 4 exercises in HEP. 2. Pt will report > or = \"moderately better\" on GROC. Rehabilitation Potential For Stated Goals: EXCELLENT              The information in this section was collected on 17 (except where otherwise noted).   HISTORY:   History of Present Injury/Illness (Reason for Referral):  Pt reports chronic low back pain and bilateral hip pain worse on the right that has been getting worse the last 2 months. Made worse by running, prolonged standing. Made better by stretching, massage. Has been diagnosed in the past with scoliosis. No recent radiographs. Past Medical History/Comorbidities:   Ms. Ganesh Nathan  has a past medical history of Acne; Gestational diabetes; Hypertension; Insomnia with sleep apnea; Low testosterone level in female; Menopausal syndrome; Mixed hyperlipidemia; and Vitamin D deficiency (11/05/2013). Ms. Ganesh Nathan  has no past surgical history on file. Social History/Living Environment:     house  Prior Level of Function/Work/Activity:   full time  Dominant Side:         RIGHT    Current Medications:       Current Outpatient Prescriptions:     progesterone micronized 4 % vaginal gel, Insert 45 mg into vagina every other day for 90 days. , Disp: 45 Tube, Rfl: 11    atorvastatin (LIPITOR) 10 mg tablet, Take 1 Tab by mouth daily. , Disp: 90 Tab, Rfl: 3    testosterone (TESTIM) 50 mg/5 gram (1 %) gel, 0.1 g by TransDERmal route daily. Max Daily Amount: 0.1 g., Disp: 30 g, Rfl: 5    cholecalciferol (VITAMIN D3) 1,000 unit cap, Take 1,000 Units by mouth daily. Indications: OTC, Disp: , Rfl:     flaxseed oil 1,000 mg cap, Take 1 Cap by mouth daily. Indications: OTC, Disp: , Rfl:    Date Last Reviewed:  1/3/2018     EXAMINATION:   Observation/Orthostatic Postural Assessment:          In standing patient has increased lumbar lordosis. Palpation:          Increased tenderness to PAs of low back and posterior hips especially over the PSIS and the SI joints. ROM:          Grossly functional ROM. Decreased low back flexion movement. Slightly decreased R hip IR compared to L.    Strength:          Decreased core strength        Hip abduction L 4/5, R 3+/5        Hip extension L 3/5, R 3/5       Hip flexion L 5/5, R 4/5  Balance:          No functional deficits noted  [facility] Top Tier (FN = Functional and Non-painful, FP = Functional and Painful, DP = Dysfunctional and Painful, DN = Dysfunctional and Non-painful)  Cervical flexion: FN  Cervical extension: FN   Cervical rotation: L FN, R FN  Upper Extremity Pattern 1 (extension, IR): L FN, R FN  Upper Extremity Pattern 2 (flexion, ER): L FN, R FN  Multi-segmental flexion: DP  Multi-segmental extension: DP  Multi-segmental rotation: L FP, R FP  Single-Leg Stance: L DN, R DN  Overhead Deep Squat: DN   Summary: Pain elicited with low back flexion and extension, most limited in these movements. Back rotation functional range but end range elicited pain. Body Structures Involved:  1. Nerves  2. Bones  3. Joints  4. Muscles Body Functions Affected:  1. Sensory/Pain  2. Neuromusculoskeletal  3. Movement Related Activities and Participation Affected:  1. General Tasks and Demands  2. Mobility  3. Community, Social and Civic Life   CLINICAL PRESENTATION:   CLINICAL DECISION MAKING:   Outcome Measure: Tool Used: Modified Oswestry Low Back Pain Questionnaire  Score:  Initial: 5/50  Most Recent: X/50 (Date: -- )   Interpretation of Score: Each section is scored on a 0-5 scale, 5 representing the greatest disability. The scores of each section are added together for a total score of 50. Score 0 1-10 11-20 21-30 31-40 41-49 50   Modifier CH CI CJ CK CL CM CN     Medical Necessity:   · Skilled intervention continues to be required due to decreased function. Reason for Services/Other Comments:  · Patient continues to require skilled intervention due to decreased function. TREATMENT:   (In addition to Assessment/Re-Assessment sessions the following treatments were rendered)  Pre-treatment Symptoms/Complaints:  Pt reports no back pain today.  Started back at work yesterday, felt it start to tighten up or get sore, when felt this got up and walked or did the seated back stretches and it relieved the pain.   Pain: Initial:     0/10 Post Session:  0/10     THERAPEUTIC EXERCISE: (45 minutes):  Exercises per grid below to improve mobility and strength. Date:  12/11 Date:  12/14/17 Date:  12/18/17 12/27 12/29 1/3/18   Activity/Exercise Parameters Parameters Parameters      NuStep  10 mins, 2.0 10 mins, 2.0 10 min 3.0 10 min level 3 10 min level 3   Piriformis stretch 20 sec hold x 3         SL hip abduction 2 x 10 Clamshells RTB       Squats         LTR         TA contraction  15x 5 sec hold 2x10 5 sec hold      TA march         Bridge  RTB 20x        SLR         KTC  10x10 each side 10x10 each side 10 x 10 ea 10 x 10 ea 10 x 10 ea   OKTC  10x10 each side 10x10 each side 10 x 10 ea 10 x 10 ea 10 x 10 ea   Abdominal isometrics    10 sec hold x 5 ea 10 sec hold x 8 ea    Open book    X 10 ea X 10 X 10   Cat camel  15x using core 15x using core X 20 X 20 X 20   Quadruped opposites    X 20 X 10  X 10       X 10     Bird dog         Prayer stretch  10x  10x 30 sec hold x 3, bias with stretch on L side 30 sec hold x 3, bias with stretch on L side 30 sec hold x 3, bias with stretch on L side   Tall kneeling trunk flexion/extension      X 20   Table top lift  15x using core to lift knees in quadruped 10x using core to lift knees in quadruped 10x     DKTC   10x10      Seated pelvic tilt    X 20 X 20 X 20   Seated lumbar flexion stretch    X 20 X 20 X 20   Seated self-traction    X 5 X 3 X 5   Long axis traction    Therapist assist x 10 Therapist assist x 10 Therapist assist x 10   Prone on table, LEs hanging off, lumbar extension     X 10 2 x 10       MANUAL THERAPY: ( minutes): To increase motion and reduce pain    MODALITIES: ( minutes):       Treatment/Session Assessment:    · Response to Treatment:  Pt reported increased soreness/muscle burning in low back with tall kneeling trunk flexion, no other issues. · Compliance with Program/Exercises: Will assess as treatment progresses.   · Recommendations/Intent for next treatment session: \"Next visit will focus on advancements to more challenging activities\".   Total Treatment Duration:       Future Appointments  Date Time Provider Zeeshan Carpenter   1/5/2018 8:30 AM Payton Murillo, PT, DPT [facility]   1/15/2018 10:30 AM PST LAB [facility] PST PST   1/29/2018 9:10 AM DO ANGELICA Cantrell PST PST       Payton Murillo, PT, DPT

## 2018-01-05 ENCOUNTER — HOSPITAL ENCOUNTER (OUTPATIENT)
Dept: PHYSICAL THERAPY | Age: 43
Discharge: HOME | End: 2018-01-05
Attending: FAMILY MEDICINE
Payer: OTHER GOVERNMENT

## 2018-01-05 PROCEDURE — 97110 THERAPEUTIC EXERCISES: CPT

## 2018-01-05 NOTE — PROGRESS NOTES
Abeba Candelario  : 1975   Payor: Tabitha Shahid / Plan: [place]  ACTIVE DUTY / Product Type:  /    [address]  at [address]  [address], [address], [place]  [phone]   [phone]        OUTPATIENT PHYSICAL [address] [facility] Note and Discharge 2018   ICD-10: Treatment Diagnosis: Low back pain (M54.5), Pain in left hip (M25.552), Pain in right hip (M25.551)  Precautions/Allergies:   Review of patient's allergies indicates no known allergies. Fall Risk Score: 0 (? 5 = High Risk)  MD Orders: evaluate and treat MEDICAL/REFERRING DIAGNOSIS:  Right Hip pain and low back pain    DATE OF ONSET: --  REFERRING PHYSICIAN: Omar Best DO  RETURN PHYSICIAN APPOINTMENT: -     ASSESSMENT:  Ms. Ruy Sauceda has attended 7 physical therapy treatments and has reported a significant reduction in low back pain. She has responded well to a back stretching and strengthening HEP and ergonomic suggestions for sitting posture at work and currently reports no back pain and if back starts to hurt the exercises relieve the pain effectively. No further physical therapy is needed at this time. TREATMENT PLAN:  Discharge. Thank you for this referral,  Cosmo Cooley PT, DPT                The information in this section was collected on 17 (except where otherwise noted). HISTORY:   History of Present Injury/Illness (Reason for Referral):  Pt reports chronic low back pain and bilateral hip pain worse on the right that has been getting worse the last 2 months. Made worse by running, prolonged standing. Made better by stretching, massage. Has been diagnosed in the past with scoliosis. No recent radiographs. Past Medical History/Comorbidities:   Ms. Ruy Sauceda  has a past medical history of Acne; Gestational diabetes; Hypertension; Insomnia with sleep apnea;  Low testosterone level in female; Menopausal syndrome; Mixed hyperlipidemia; and Vitamin D deficiency (11/05/2013). Ms. Khloe Mendenhall  has no past surgical history on file. Social History/Living Environment:     house  Prior Level of Function/Work/Activity:   full time  Dominant Side:         RIGHT    Current Medications:       Current Outpatient Prescriptions:     progesterone micronized 4 % vaginal gel, Insert 45 mg into vagina every other day for 90 days. , Disp: 45 Tube, Rfl: 11    atorvastatin (LIPITOR) 10 mg tablet, Take 1 Tab by mouth daily. , Disp: 90 Tab, Rfl: 3    testosterone (TESTIM) 50 mg/5 gram (1 %) gel, 0.1 g by TransDERmal route daily. Max Daily Amount: 0.1 g., Disp: 30 g, Rfl: 5    cholecalciferol (VITAMIN D3) 1,000 unit cap, Take 1,000 Units by mouth daily. Indications: OTC, Disp: , Rfl:     flaxseed oil 1,000 mg cap, Take 1 Cap by mouth daily. Indications: OTC, Disp: , Rfl:    Date Last Reviewed:  1/5/2018     EXAMINATION:   Observation/Orthostatic Postural Assessment:          In standing patient has increased lumbar lordosis. Palpation:          Increased tenderness to PAs of low back and posterior hips especially over the PSIS and the SI joints. ROM:          Grossly functional ROM. Decreased low back flexion movement. Slightly decreased R hip IR compared to L.    Strength:          Decreased core strength        Hip abduction L 4/5, R 3+/5        Hip extension L 3/5, R 3/5       Hip flexion L 5/5, R 4/5  Balance:          No functional deficits noted  [facility] Top Tier (FN = Functional and Non-painful, FP = Functional and Painful, DP = Dysfunctional and Painful, DN = Dysfunctional and Non-painful)  Cervical flexion: FN  Cervical extension: FN   Cervical rotation: L FN, R FN  Upper Extremity Pattern 1 (extension, IR): L FN, R FN  Upper Extremity Pattern 2 (flexion, ER): L FN, R FN  Multi-segmental flexion: DP  Multi-segmental extension: DP  Multi-segmental rotation: L FP, R FP  Single-Leg Stance: L DN, R DN  Overhead Deep Squat: DN   Summary: Pain elicited with low back flexion and extension, most limited in these movements. Back rotation functional range but end range elicited pain. Body Structures Involved:  1. Nerves  2. Bones  3. Joints  4. Muscles Body Functions Affected:  1. Sensory/Pain  2. Neuromusculoskeletal  3. Movement Related Activities and Participation Affected:  1. General Tasks and Demands  2. Mobility  3. Community, Social and Civic Life   CLINICAL PRESENTATION:   CLINICAL DECISION MAKING:   Outcome Measure: Tool Used: Modified Oswestry Low Back Pain Questionnaire  Score:  Initial: 5/50  Most Recent: X/50 (Date: -- )   Interpretation of Score: Each section is scored on a 0-5 scale, 5 representing the greatest disability. The scores of each section are added together for a total score of 50. Score 0 1-10 11-20 21-30 31-40 41-49 50   Modifier CH CI CJ CK CL CM CN     Medical Necessity:   · Skilled intervention continues to be required due to decreased function. Reason for Services/Other Comments:  · Patient continues to require skilled intervention due to decreased function. TREATMENT:   (In addition to Assessment/Re-Assessment sessions the following treatments were rendered)  Pre-treatment Symptoms/Complaints:  Pt reports no back pain today. Continuing to have no issues at work as long as she can keep doing stretches and movements. Pain: Initial:     0/10 Post Session:  0/10     THERAPEUTIC EXERCISE: (45 minutes):  Exercises per grid below to improve mobility and strength.     Date:  12/11 Date:  12/14/17 Date:  12/18/17 12/27 12/29 1/3/18 1/5   Activity/Exercise Parameters Parameters Parameters       NuStep  10 mins, 2.0 10 mins, 2.0 10 min 3.0 10 min level 3 10 min level 3 10 min level 3   Piriformis stretch 20 sec hold x 3          SL hip abduction 2 x 10 Clamshells RTB        Squats          LTR          TA contraction  15x 5 sec hold 2x10 5 sec hold       TA march          Bridge  RTB 20x         SLR          KTC  10x10 each side 10x10 each side 10 x 10 ea 10 x 10 ea 10 x 10 ea 10 x 10 ea   OKTC  10x10 each side 10x10 each side 10 x 10 ea 10 x 10 ea 10 x 10 ea 10 x 10 ea   Abdominal isometrics    10 sec hold x 5 ea 10 sec hold x 8 ea  10 sec hold x 8   Open book    X 10 ea X 10 X 10 X 10   Cat camel  15x using core 15x using core X 20 X 20 X 20 X 20   Quadruped opposites    X 20 X 10  X 10 X 10       X 10      Bird dog          Prayer stretch  10x  10x 30 sec hold x 3, bias with stretch on L side 30 sec hold x 3, bias with stretch on L side 30 sec hold x 3, bias with stretch on L side 30 sec hold x 3, bias with stretch on L side   Tall kneeling trunk flexion/extension      X 20 X 20   Table top lift  15x using core to lift knees in quadruped 10x using core to lift knees in quadruped 10x      DKTC   10x10       Seated pelvic tilt    X 20 X 20 X 20 X 20   Seated lumbar flexion stretch    X 20 X 20 X 20 X 20   Seated self-traction    X 5 X 3 X 5    Long axis traction    Therapist assist x 10 Therapist assist x 10 Therapist assist x 10 Therapist assist x 10   Prone on table, LEs hanging off, lumbar extension     X 10 2 x 10 2 x 10       MANUAL THERAPY: ( minutes): To increase motion and reduce pain    MODALITIES: ( minutes):       Treatment/Session Assessment:    · Response to Treatment:  Pt no pain and minimal fatigue with exercises. Pt reports independence with HEP and that she can do these when back pain starts and it relieves it, feels that she has no further need for physical therapy at this time. · Compliance with Program/Exercises: Will assess as treatment progresses. · Recommendations/Intent for next treatment session: \"Next visit will focus on advancements to more challenging activities\".   Total Treatment Duration:  PT Patient Time In/Time Out  Time In: 0830  Time Out: 0915    Future Appointments  Date Time Provider Zeeshan Carpenter   1/15/2018 10:30 AM PST LAB [facility] PST PST   1/29/2018 9:10 AM Meeta Santiago DO [facility] PST APOLLO Peck PT, DPT

## 2018-05-17 ENCOUNTER — HOSPITAL ENCOUNTER (OUTPATIENT)
Dept: MAMMOGRAPHY | Age: 43
Discharge: HOME | End: 2018-05-17
Attending: FAMILY MEDICINE
Payer: OTHER GOVERNMENT

## 2018-05-17 DIAGNOSIS — Z12.39 SCREENING BREAST EXAMINATION: ICD-10-CM

## 2018-05-17 PROCEDURE — 77067 SCR MAMMO BI INCL CAD: CPT

## 2019-06-18 ENCOUNTER — HOSPITAL ENCOUNTER (OUTPATIENT)
Dept: MAMMOGRAPHY | Age: 44
Discharge: HOME | End: 2019-06-18
Attending: FAMILY MEDICINE

## 2019-06-18 DIAGNOSIS — Z12.39 BREAST SCREENING, UNSPECIFIED: ICD-10-CM

## 2020-06-19 ENCOUNTER — HOSPITAL ENCOUNTER (OUTPATIENT)
Dept: MAMMOGRAPHY | Age: 45
Discharge: HOME | End: 2020-06-19
Attending: FAMILY MEDICINE

## 2020-06-19 DIAGNOSIS — Z12.31 SCREENING MAMMOGRAM FOR HIGH-RISK PATIENT: ICD-10-CM

## 2021-06-09 ENCOUNTER — TRANSCRIBE ORDER (OUTPATIENT)
Dept: SCHEDULING | Age: 46
End: 2021-06-09

## 2021-06-09 DIAGNOSIS — Z12.31 VISIT FOR SCREENING MAMMOGRAM: Primary | ICD-10-CM

## 2021-06-26 ENCOUNTER — HOSPITAL ENCOUNTER (OUTPATIENT)
Dept: MAMMOGRAPHY | Age: 46
Discharge: HOME | End: 2021-06-26
Attending: FAMILY MEDICINE
Payer: OTHER GOVERNMENT

## 2021-06-26 DIAGNOSIS — Z12.31 VISIT FOR SCREENING MAMMOGRAM: ICD-10-CM

## 2021-06-26 PROCEDURE — 77067 SCR MAMMO BI INCL CAD: CPT

## 2022-05-26 DIAGNOSIS — Z11.59 NEED FOR HEPATITIS C SCREENING TEST: ICD-10-CM

## 2022-05-26 DIAGNOSIS — Z00.00 ROUTINE GENERAL MEDICAL EXAMINATION AT A HEALTH CARE FACILITY: ICD-10-CM

## 2022-05-26 DIAGNOSIS — E55.9 VITAMIN D DEFICIENCY: ICD-10-CM

## 2022-05-26 DIAGNOSIS — E78.2 MIXED HYPERLIPIDEMIA: ICD-10-CM

## 2022-05-26 DIAGNOSIS — I10 PRIMARY HYPERTENSION: Primary | ICD-10-CM

## 2022-05-31 DIAGNOSIS — E55.9 VITAMIN D DEFICIENCY: ICD-10-CM

## 2022-05-31 DIAGNOSIS — Z11.59 NEED FOR HEPATITIS C SCREENING TEST: ICD-10-CM

## 2022-05-31 DIAGNOSIS — I10 PRIMARY HYPERTENSION: ICD-10-CM

## 2022-05-31 DIAGNOSIS — Z00.00 ROUTINE GENERAL MEDICAL EXAMINATION AT A HEALTH CARE FACILITY: ICD-10-CM

## 2022-05-31 LAB
25(OH)D3 SERPL-MCNC: 53.2 NG/ML (ref 30–100)
ALBUMIN SERPL-MCNC: 4 G/DL (ref 3.5–5)
ALBUMIN/GLOB SERPL: 1.3 {RATIO} (ref 1.2–3.5)
ALP SERPL-CCNC: 51 U/L (ref 50–136)
ALT SERPL-CCNC: 16 U/L (ref 12–65)
ANION GAP SERPL CALC-SCNC: 6 MMOL/L (ref 7–16)
AST SERPL-CCNC: 14 U/L (ref 15–37)
BASOPHILS # BLD: 0 K/UL (ref 0–0.2)
BASOPHILS NFR BLD: 1 % (ref 0–2)
BILIRUB SERPL-MCNC: 0.5 MG/DL (ref 0.2–1.1)
BUN SERPL-MCNC: 9 MG/DL (ref 6–23)
CALCIUM SERPL-MCNC: 9.5 MG/DL (ref 8.3–10.4)
CHLORIDE SERPL-SCNC: 111 MMOL/L (ref 98–107)
CHOLEST SERPL-MCNC: 185 MG/DL
CO2 SERPL-SCNC: 25 MMOL/L (ref 21–32)
CREAT SERPL-MCNC: 1.2 MG/DL (ref 0.6–1)
DIFFERENTIAL METHOD BLD: NORMAL
EOSINOPHIL # BLD: 0.1 K/UL (ref 0–0.8)
EOSINOPHIL NFR BLD: 2 % (ref 0.5–7.8)
ERYTHROCYTE [DISTWIDTH] IN BLOOD BY AUTOMATED COUNT: 12.9 % (ref 11.9–14.6)
GLOBULIN SER CALC-MCNC: 3.1 G/DL (ref 2.3–3.5)
GLUCOSE SERPL-MCNC: 93 MG/DL (ref 65–100)
HCT VFR BLD AUTO: 42.3 % (ref 35.8–46.3)
HCV AB SER QL: NONREACTIVE
HDLC SERPL-MCNC: 74 MG/DL (ref 40–60)
HDLC SERPL: 2.5 {RATIO}
HGB BLD-MCNC: 13.7 G/DL (ref 11.7–15.4)
IMM GRANULOCYTES # BLD AUTO: 0 K/UL (ref 0–0.5)
IMM GRANULOCYTES NFR BLD AUTO: 0 % (ref 0–5)
LDLC SERPL CALC-MCNC: 94.8 MG/DL
LYMPHOCYTES # BLD: 1.8 K/UL (ref 0.5–4.6)
LYMPHOCYTES NFR BLD: 38 % (ref 13–44)
MCH RBC QN AUTO: 29 PG (ref 26.1–32.9)
MCHC RBC AUTO-ENTMCNC: 32.4 G/DL (ref 31.4–35)
MCV RBC AUTO: 89.6 FL (ref 79.6–97.8)
MONOCYTES # BLD: 0.3 K/UL (ref 0.1–1.3)
MONOCYTES NFR BLD: 7 % (ref 4–12)
NEUTS SEG # BLD: 2.4 K/UL (ref 1.7–8.2)
NEUTS SEG NFR BLD: 52 % (ref 43–78)
NRBC # BLD: 0 K/UL (ref 0–0.2)
PLATELET # BLD AUTO: 164 K/UL (ref 150–450)
PMV BLD AUTO: 12 FL (ref 9.4–12.3)
POTASSIUM SERPL-SCNC: 4 MMOL/L (ref 3.5–5.1)
PROT SERPL-MCNC: 7.1 G/DL (ref 6.3–8.2)
RBC # BLD AUTO: 4.72 M/UL (ref 4.05–5.2)
SODIUM SERPL-SCNC: 142 MMOL/L (ref 136–145)
TRIGL SERPL-MCNC: 81 MG/DL (ref 35–150)
TSH, 3RD GENERATION: 1.57 UIU/ML (ref 0.36–3.74)
VLDLC SERPL CALC-MCNC: 16.2 MG/DL (ref 6–23)
WBC # BLD AUTO: 4.6 K/UL (ref 4.3–11.1)

## 2022-06-08 ENCOUNTER — OFFICE VISIT (OUTPATIENT)
Dept: FAMILY MEDICINE CLINIC | Facility: CLINIC | Age: 47
End: 2022-06-08
Payer: OTHER GOVERNMENT

## 2022-06-08 VITALS
HEIGHT: 64 IN | WEIGHT: 167 LBS | SYSTOLIC BLOOD PRESSURE: 130 MMHG | BODY MASS INDEX: 28.51 KG/M2 | DIASTOLIC BLOOD PRESSURE: 76 MMHG

## 2022-06-08 DIAGNOSIS — Z12.4 PAP SMEAR FOR CERVICAL CANCER SCREENING: ICD-10-CM

## 2022-06-08 DIAGNOSIS — Z12.11 SPECIAL SCREENING FOR MALIGNANT NEOPLASMS, COLON: ICD-10-CM

## 2022-06-08 DIAGNOSIS — Z13.31 SCREENING FOR DEPRESSION: ICD-10-CM

## 2022-06-08 DIAGNOSIS — E78.2 MIXED HYPERLIPIDEMIA: ICD-10-CM

## 2022-06-08 DIAGNOSIS — Z12.31 SCREENING MAMMOGRAM FOR HIGH-RISK PATIENT: ICD-10-CM

## 2022-06-08 DIAGNOSIS — Z00.00 ROUTINE GENERAL MEDICAL EXAMINATION AT A HEALTH CARE FACILITY: Primary | ICD-10-CM

## 2022-06-08 LAB
BILIRUBIN, URINE, POC: NEGATIVE
BLOOD URINE, POC: NEGATIVE
GLUCOSE URINE, POC: NEGATIVE
KETONES, URINE, POC: NEGATIVE
LEUKOCYTE ESTERASE, URINE, POC: NEGATIVE
NITRITE, URINE, POC: NEGATIVE
PH, URINE, POC: 5.5 (ref 4.6–8)
PROTEIN,URINE, POC: NEGATIVE
SPECIFIC GRAVITY, URINE, POC: 1.25 (ref 1–1.03)
URINALYSIS CLARITY, POC: CLEAR
URINALYSIS COLOR, POC: YELLOW
UROBILINOGEN, POC: ABNORMAL

## 2022-06-08 PROCEDURE — 81003 URINALYSIS AUTO W/O SCOPE: CPT | Performed by: FAMILY MEDICINE

## 2022-06-08 PROCEDURE — 99396 PREV VISIT EST AGE 40-64: CPT | Performed by: FAMILY MEDICINE

## 2022-06-08 RX ORDER — ATORVASTATIN CALCIUM 10 MG/1
10 TABLET, FILM COATED ORAL DAILY
Qty: 90 TABLET | Refills: 3 | Status: SHIPPED | OUTPATIENT
Start: 2022-06-08

## 2022-06-08 RX ORDER — FERROUS SULFATE 325(65) MG
325 TABLET ORAL
COMMUNITY

## 2022-06-08 ASSESSMENT — ENCOUNTER SYMPTOMS
NAUSEA: 0
SHORTNESS OF BREATH: 0
VOMITING: 0

## 2022-06-08 NOTE — PROGRESS NOTES
PROGRESS NOTE    SUBJECTIVE:   Tammi Potter is a 42 y.o. female seen for a follow up visit regarding the following chief complaint:     Chief Complaint   Patient presents with    Annual Exam    Discuss Labs           HPI patient presents office today for complete physical without complaints      Past Medical History, Past Surgical History, Family history, Social History, and Medications were all reviewed with the patient today and updated as necessary. Current Outpatient Medications   Medication Sig Dispense Refill    ferrous sulfate (IRON 325) 325 (65 Fe) MG tablet Take 325 mg by mouth daily (with breakfast)      atorvastatin (LIPITOR) 10 MG tablet Take 1 tablet by mouth daily 90 tablet 3    vitamin D 25 MCG (1000 UT) CAPS Take 1,000 Units by mouth daily       No current facility-administered medications for this visit. No Known Allergies  Patient Active Problem List   Diagnosis    Low testosterone level in female    Vitamin D deficiency    Hypertension    Acne    Menopausal syndrome    Mixed hyperlipidemia     Past Medical History:   Diagnosis Date    Acne     Gestational diabetes     Hypertension     Insomnia with sleep apnea     Low testosterone level in female     Menopausal syndrome     Mixed hyperlipidemia     Vitamin D deficiency 11/05/2013    [facility] (Level 13)     Past Surgical History:   Procedure Laterality Date    BREAST REDUCTION SURGERY Bilateral 09/26/2013     Family History   Problem Relation Age of Onset    Breast Cancer Paternal Aunt 28   [place] Elevated Lipids Mother     Hypertension Mother     Diabetes Father     Elevated Lipids Father     Hypertension Father     Diabetes Mother         Pre-diabetes    Stroke Father      Social History     Tobacco Use    Smoking status: Never Smoker    Smokeless tobacco: Never Used   Substance Use Topics    Alcohol use: Yes         Review of Systems   Constitutional: Negative for chills and fever.    Respiratory: Negative for shortness of breath. Cardiovascular: Negative for chest pain. Gastrointestinal: Negative for nausea and vomiting. Endocrine: Negative for cold intolerance and heat intolerance. Genitourinary: Negative for difficulty urinating, frequency, hematuria, menstrual problem, pelvic pain, urgency, vaginal bleeding, vaginal discharge and vaginal pain. Skin: Negative for rash. Neurological: Negative for dizziness and headaches. Psychiatric/Behavioral: Negative. OBJECTIVE:  /76 (Site: Left Upper Arm, Position: Sitting, Cuff Size: Small Adult)   Ht 5' 4\" (1.626 m)   Wt 167 lb (75.8 kg)   BMI 28.67 kg/m²      Physical Exam  Vitals and nursing note reviewed. Exam conducted with a chaperone present. Constitutional:       Appearance: Normal appearance. HENT:      Head: Normocephalic and atraumatic. Right Ear: Tympanic membrane normal.      Left Ear: Tympanic membrane normal.      Nose: Nose normal.      Mouth/Throat:      Mouth: Mucous membranes are moist.      Pharynx: No oropharyngeal exudate or posterior oropharyngeal erythema. Eyes:      Extraocular Movements: Extraocular movements intact. Conjunctiva/sclera: Conjunctivae normal.      Pupils: Pupils are equal, round, and reactive to light. Cardiovascular:      Rate and Rhythm: Normal rate and regular rhythm. Pulses: Normal pulses. Heart sounds: Normal heart sounds. Pulmonary:      Effort: Pulmonary effort is normal.      Breath sounds: Normal breath sounds. Abdominal:      General: Abdomen is flat. Bowel sounds are normal.      Palpations: Abdomen is soft. Hernia: There is no hernia in the left inguinal area or right inguinal area. Genitourinary:     General: Normal vulva. Urethra: No urethral lesion. Vagina: Normal.      Cervix: Normal.      Uterus: Normal.       Adnexa: Right adnexa normal and left adnexa normal.      Rectum: Normal. Guaiac result negative. No mass. Musculoskeletal:         General: Normal range of motion. Cervical back: Normal range of motion and neck supple. Skin:     General: Skin is warm and dry. Capillary Refill: Capillary refill takes less than 2 seconds. Neurological:      General: No focal deficit present. Mental Status: She is alert and oriented to person, place, and time. Psychiatric:         Mood and Affect: Mood normal.         Behavior: Behavior normal.         Thought Content: Thought content normal.          Medical problems and test results were reviewed with the patient today.      Recent Results (from the past 672 hour(s))   TSH    Collection Time: 05/31/22 11:34 AM   Result Value Ref Range    TSH, 3RD GENERATION 1.570 0.358 - 3.740 uIU/mL   Hepatitis C Antibody    Collection Time: 05/31/22 11:34 AM   Result Value Ref Range    Hepatitis C Ab NONREACTIVE NONREACTIVE     Vitamin D 25 Hydroxy    Collection Time: 05/31/22 11:34 AM   Result Value Ref Range    Vit D, 25-Hydroxy 53.2 30.0 - 100.0 ng/mL   Lipid Panel    Collection Time: 05/31/22 11:34 AM   Result Value Ref Range    Cholesterol, Total 185 <200 MG/DL    Triglycerides 81 35 - 150 MG/DL    HDL 74 (H) 40 - 60 MG/DL    LDL Calculated 94.8 <100 MG/DL    VLDL Cholesterol Calculated 16.2 6.0 - 23.0 MG/DL    Chol/HDL Ratio 2.5     Comprehensive Metabolic Panel    Collection Time: 05/31/22 11:34 AM   Result Value Ref Range    Sodium 142 136 - 145 mmol/L    Potassium 4.0 3.5 - 5.1 mmol/L    Chloride 111 (H) 98 - 107 mmol/L    CO2 25 21 - 32 mmol/L    Anion Gap 6 (L) 7 - 16 mmol/L    Glucose 93 65 - 100 mg/dL    BUN 9 6 - 23 MG/DL    CREATININE 1.20 (H) 0.6 - 1.0 MG/DL    GFR African American >60 >60 ml/min/1.73m2    GFR Non- 52 (L) >60 ml/min/1.73m2    Calcium 9.5 8.3 - 10.4 MG/DL    Total Bilirubin 0.5 0.2 - 1.1 MG/DL    ALT 16 12 - 65 U/L    AST 14 (L) 15 - 37 U/L    Alk Phosphatase 51 50 - 136 U/L    Total Protein 7.1 6.3 - 8.2 g/dL    Albumin 4.0 3.5 - 5.0 g/dL    Globulin 3.1 2.3 - 3.5 g/dL    Albumin/Globulin Ratio 1.3 1.2 - 3.5     CBC with Auto Differential    Collection Time: 05/31/22 11:34 AM   Result Value Ref Range    WBC 4.6 4.3 - 11.1 K/uL    RBC 4.72 4.05 - 5.2 M/uL    Hemoglobin 13.7 11.7 - 15.4 g/dL    Hematocrit 42.3 35.8 - 46.3 %    MCV 89.6 79.6 - 97.8 FL    MCH 29.0 26.1 - 32.9 PG    MCHC 32.4 31.4 - 35.0 g/dL    RDW 12.9 11.9 - 14.6 %    Platelets 675 305 - 134 K/uL    MPV 12.0 9.4 - 12.3 FL    nRBC 0.00 0.0 - 0.2 K/uL    Differential Type AUTOMATED      Seg Neutrophils 52 43 - 78 %    Lymphocytes 38 13 - 44 %    Monocytes 7 4.0 - 12.0 %    Eosinophils % 2 0.5 - 7.8 %    Basophils 1 0.0 - 2.0 %    Immature Granulocytes 0 0.0 - 5.0 %    Segs Absolute 2.4 1.7 - 8.2 K/UL    Absolute Lymph # 1.8 0.5 - 4.6 K/UL    Absolute Mono # 0.3 0.1 - 1.3 K/UL    Absolute Eos # 0.1 0.0 - 0.8 K/UL    Basophils Absolute 0.0 0.0 - 0.2 K/UL    Absolute Immature Granulocyte 0.0 0.0 - 0.5 K/UL   AMB POC URINALYSIS DIP STICK AUTO W/O MICRO    Collection Time: 06/08/22  8:54 AM   Result Value Ref Range    Color, Urine, POC YELLOW     Clarity, Urine, POC CLEAR     Glucose, Urine, POC Negative Negative    Bilirubin, Urine, POC Negative Negative    KETONES, Urine, POC Negative Negative    Specific Gravity, Urine, POC 1.25 (A) 1.001 - 1.035    Blood, Urine, POC negative Negative    pH, Urine, POC 5.5 4.6 - 8.0    Protein, Urine, POC Negative Negative    Urobilinogen, POC 0.2 mg/dl     Nitrate, Urine, POC negative Negative    Leukocyte Esterase, Urine, POC Negative Negative       ASSESSMENT and PLAN    Visit Diagnoses and Associated Orders     Routine general medical examination at a health care facility    -  Primary    AMB POC URINALYSIS DIP STICK AUTO W/O MICRO [45040 CPT(R)]           Pap smear for cervical cancer screening        PAP IG, CT-NG, rfx Aptima HPV ASCUS (126613, 393912) [[id number] Custom]   - Future Order    PAP IG, CT-NG, rfx Aptima HPV ASCUS (738992, 172920) [[id number] Custom]           Special screening for malignant neoplasms, colon        [address] [facility] - Colonoscopy [UWJ418 Custom]           Mixed hyperlipidemia        atorvastatin (LIPITOR) 10 MG tablet [68599]           Screening for depression             Screening mammogram for high-risk patient        LG SCREENING W CAD BILATERAL 2 VW [53299 Custom]   - Future Order         ORDERS WITHOUT AN ASSOCIATED DIAGNOSIS    ferrous sulfate (IRON 325) 325 (65 Fe) MG tablet [3074]               Wendy was seen today for annual exam and discuss labs. Diagnoses and all orders for this visit:    Routine general medical examination at a health care facility  -     AMB POC URINALYSIS DIP STICK AUTO W/O MICRO    Pap smear for cervical cancer screening  -     PAP IG, CT-NG, rfx Aptima HPV ASCUS (049023, 330528); Future  -     PAP IG, CT-NG, rfx Aptima HPV ASCUS (563834, 497953)    Special screening for malignant neoplasms, colon  -     [address] [facility] - Colonoscopy    Mixed hyperlipidemia  -     atorvastatin (LIPITOR) 10 MG tablet;  Take 1 tablet by mouth daily    Screening for depression    Screening mammogram for high-risk patient  -     LG SCREENING W CAD BILATERAL 2 VW; Future

## 2022-06-10 LAB
C TRACH RRNA CVX QL NAA+PROBE: NEGATIVE
CYTOLOGIST CVX/VAG CYTO: NORMAL
CYTOLOGY CVX/VAG DOC THIN PREP: NORMAL
HPV REFLEX: NORMAL
Lab: NORMAL
N GONORRHOEA RRNA CVX QL NAA+PROBE: NEGATIVE
PATH REPORT.FINAL DX SPEC: NORMAL
STAT OF ADQ CVX/VAG CYTO-IMP: NORMAL

## 2022-08-19 ENCOUNTER — HOSPITAL ENCOUNTER (OUTPATIENT)
Dept: MAMMOGRAPHY | Age: 47
Discharge: HOME | End: 2022-08-22
Payer: OTHER GOVERNMENT

## 2022-08-19 DIAGNOSIS — Z12.31 VISIT FOR SCREENING MAMMOGRAM: ICD-10-CM

## 2022-08-19 PROCEDURE — 77063 BREAST TOMOSYNTHESIS BI: CPT

## 2023-06-09 ENCOUNTER — NURSE ONLY (OUTPATIENT)
Dept: FAMILY MEDICINE CLINIC | Facility: CLINIC | Age: 48
End: 2023-06-09
Payer: OTHER GOVERNMENT

## 2023-06-09 DIAGNOSIS — E55.9 VITAMIN D DEFICIENCY: ICD-10-CM

## 2023-06-09 DIAGNOSIS — E78.2 MIXED HYPERLIPIDEMIA: ICD-10-CM

## 2023-06-09 DIAGNOSIS — Z00.00 LABORATORY EXAMINATION ORDERED AS PART OF A ROUTINE GENERAL MEDICAL EXAMINATION: Primary | ICD-10-CM

## 2023-06-09 LAB
25(OH)D3 SERPL-MCNC: 61.8 NG/ML (ref 30–100)
ALBUMIN SERPL-MCNC: 4 G/DL (ref 3.5–5)
ALBUMIN/GLOB SERPL: 1.3 (ref 0.4–1.6)
ALP SERPL-CCNC: 47 U/L (ref 50–136)
ALT SERPL-CCNC: 15 U/L (ref 12–65)
ANION GAP SERPL CALC-SCNC: 1 MMOL/L (ref 2–11)
AST SERPL-CCNC: 17 U/L (ref 15–37)
BILIRUB SERPL-MCNC: 0.3 MG/DL (ref 0.2–1.1)
BILIRUBIN, URINE, POC: NEGATIVE
BLOOD URINE, POC: NEGATIVE
BUN SERPL-MCNC: 11 MG/DL (ref 6–23)
CALCIUM SERPL-MCNC: 9.3 MG/DL (ref 8.3–10.4)
CHLORIDE SERPL-SCNC: 110 MMOL/L (ref 101–110)
CHOLEST SERPL-MCNC: 175 MG/DL
CO2 SERPL-SCNC: 27 MMOL/L (ref 21–32)
CREAT SERPL-MCNC: 1.3 MG/DL (ref 0.6–1)
GLOBULIN SER CALC-MCNC: 3.1 G/DL (ref 2.8–4.5)
GLUCOSE SERPL-MCNC: 89 MG/DL (ref 65–100)
GLUCOSE URINE, POC: NEGATIVE
GRANS ABSOLUTE, POC: 2 K/UL
GRANULOCYTES %, POC: 41.2 %
HDLC SERPL-MCNC: 83 MG/DL (ref 40–60)
HDLC SERPL: 2.1
HEMATOCRIT, POC: 45.8 %
HEMOGLOBIN, POC: 14.8 G/DL
HIV 1+2 AB+HIV1 P24 AG SERPL QL IA: NONREACTIVE
HIV 1/2 RESULT COMMENT: NORMAL
KETONES, URINE, POC: NEGATIVE
LDLC SERPL CALC-MCNC: 83.8 MG/DL
LEUKOCYTE ESTERASE, URINE, POC: NEGATIVE
LYMPHOCYTE %, POC: 50.8 %
LYMPHS ABSOLUTE, POC: 2.3 K/UL
MCH, POC: NORMAL PG (ref 40–?)
MCHC, POC: 32.3
MCV, POC: 94.7
MONOCYTE %, POC: 8 %
MONOCYTE, ABSOLUTE POC: 0.4 K/UL
MPV, POC: 8.7 FL
NITRITE, URINE, POC: NEGATIVE
PH, URINE, POC: 6 (ref 4.6–8)
PLATELET COUNT, POC: 217 K/UL
POTASSIUM SERPL-SCNC: 3.7 MMOL/L (ref 3.5–5.1)
PROT SERPL-MCNC: 7.1 G/DL (ref 6.3–8.2)
PROTEIN,URINE, POC: NEGATIVE
RBC, POC: 4.84 M/UL
RDW, POC: 13.1 %
SODIUM SERPL-SCNC: 138 MMOL/L (ref 133–143)
SPECIFIC GRAVITY, URINE, POC: 1.02 (ref 1–1.03)
TRIGL SERPL-MCNC: 41 MG/DL (ref 35–150)
TSH, 3RD GENERATION: 0.75 UIU/ML (ref 0.36–3.74)
URINALYSIS CLARITY, POC: CLEAR
URINALYSIS COLOR, POC: YELLOW
UROBILINOGEN, POC: NORMAL
VLDLC SERPL CALC-MCNC: 8.2 MG/DL (ref 6–23)
WBC, POC: 4.5 K/UL

## 2023-06-09 PROCEDURE — 81002 URINALYSIS NONAUTO W/O SCOPE: CPT | Performed by: FAMILY MEDICINE

## 2023-06-09 PROCEDURE — 36415 COLL VENOUS BLD VENIPUNCTURE: CPT | Performed by: FAMILY MEDICINE

## 2023-06-09 PROCEDURE — 85025 COMPLETE CBC W/AUTO DIFF WBC: CPT | Performed by: FAMILY MEDICINE

## 2023-06-21 ASSESSMENT — PATIENT HEALTH QUESTIONNAIRE - PHQ9
SUM OF ALL RESPONSES TO PHQ QUESTIONS 1-9: 0
1. LITTLE INTEREST OR PLEASURE IN DOING THINGS: 0
SUM OF ALL RESPONSES TO PHQ9 QUESTIONS 1 & 2: 0
SUM OF ALL RESPONSES TO PHQ9 QUESTIONS 1 & 2: 0
2. FEELING DOWN, DEPRESSED OR HOPELESS: 0
SUM OF ALL RESPONSES TO PHQ QUESTIONS 1-9: 0
1. LITTLE INTEREST OR PLEASURE IN DOING THINGS: NOT AT ALL
SUM OF ALL RESPONSES TO PHQ QUESTIONS 1-9: 0
2. FEELING DOWN, DEPRESSED OR HOPELESS: NOT AT ALL
SUM OF ALL RESPONSES TO PHQ QUESTIONS 1-9: 0

## 2023-06-22 ENCOUNTER — OFFICE VISIT (OUTPATIENT)
Dept: FAMILY MEDICINE CLINIC | Facility: CLINIC | Age: 48
End: 2023-06-22
Payer: OTHER GOVERNMENT

## 2023-06-22 VITALS
DIASTOLIC BLOOD PRESSURE: 70 MMHG | WEIGHT: 162 LBS | SYSTOLIC BLOOD PRESSURE: 110 MMHG | BODY MASS INDEX: 27.66 KG/M2 | HEIGHT: 64 IN

## 2023-06-22 DIAGNOSIS — Z12.11 SPECIAL SCREENING FOR MALIGNANT NEOPLASMS, COLON: ICD-10-CM

## 2023-06-22 DIAGNOSIS — E78.2 MIXED HYPERLIPIDEMIA: ICD-10-CM

## 2023-06-22 DIAGNOSIS — Z00.00 ROUTINE GENERAL MEDICAL EXAMINATION AT A HEALTH CARE FACILITY: Primary | ICD-10-CM

## 2023-06-22 DIAGNOSIS — N95.1 PERIMENOPAUSAL: ICD-10-CM

## 2023-06-22 DIAGNOSIS — Z12.31 SCREENING MAMMOGRAM FOR HIGH-RISK PATIENT: ICD-10-CM

## 2023-06-22 DIAGNOSIS — Z13.31 SCREENING FOR DEPRESSION: ICD-10-CM

## 2023-06-22 PROCEDURE — 90471 IMMUNIZATION ADMIN: CPT | Performed by: FAMILY MEDICINE

## 2023-06-22 PROCEDURE — 90715 TDAP VACCINE 7 YRS/> IM: CPT | Performed by: FAMILY MEDICINE

## 2023-06-22 PROCEDURE — 3074F SYST BP LT 130 MM HG: CPT | Performed by: FAMILY MEDICINE

## 2023-06-22 PROCEDURE — 3078F DIAST BP <80 MM HG: CPT | Performed by: FAMILY MEDICINE

## 2023-06-22 PROCEDURE — 99396 PREV VISIT EST AGE 40-64: CPT | Performed by: FAMILY MEDICINE

## 2023-06-22 RX ORDER — LACTOBACILLUS RHAMNOSUS GG 10B CELL
CAPSULE ORAL
COMMUNITY
Start: 2023-05-01

## 2023-06-22 RX ORDER — ATORVASTATIN CALCIUM 10 MG/1
10 TABLET, FILM COATED ORAL DAILY
Qty: 90 TABLET | Refills: 3 | Status: SHIPPED | OUTPATIENT
Start: 2023-06-22

## 2023-06-22 SDOH — ECONOMIC STABILITY: HOUSING INSECURITY
IN THE LAST 12 MONTHS, WAS THERE A TIME WHEN YOU DID NOT HAVE A STEADY PLACE TO SLEEP OR SLEPT IN A SHELTER (INCLUDING NOW)?: NO

## 2023-06-22 SDOH — ECONOMIC STABILITY: FOOD INSECURITY: WITHIN THE PAST 12 MONTHS, YOU WORRIED THAT YOUR FOOD WOULD RUN OUT BEFORE YOU GOT MONEY TO BUY MORE.: NEVER TRUE

## 2023-06-22 SDOH — ECONOMIC STABILITY: INCOME INSECURITY: HOW HARD IS IT FOR YOU TO PAY FOR THE VERY BASICS LIKE FOOD, HOUSING, MEDICAL CARE, AND HEATING?: NOT HARD AT ALL

## 2023-06-22 SDOH — ECONOMIC STABILITY: FOOD INSECURITY: WITHIN THE PAST 12 MONTHS, THE FOOD YOU BOUGHT JUST DIDN'T LAST AND YOU DIDN'T HAVE MONEY TO GET MORE.: NEVER TRUE

## 2023-06-22 ASSESSMENT — PATIENT HEALTH QUESTIONNAIRE - PHQ9
2. FEELING DOWN, DEPRESSED OR HOPELESS: 0
SUM OF ALL RESPONSES TO PHQ QUESTIONS 1-9: 0
SUM OF ALL RESPONSES TO PHQ QUESTIONS 1-9: 0
1. LITTLE INTEREST OR PLEASURE IN DOING THINGS: 0
SUM OF ALL RESPONSES TO PHQ QUESTIONS 1-9: 0
SUM OF ALL RESPONSES TO PHQ QUESTIONS 1-9: 0
SUM OF ALL RESPONSES TO PHQ9 QUESTIONS 1 & 2: 0

## 2023-06-22 ASSESSMENT — ENCOUNTER SYMPTOMS
ABDOMINAL PAIN: 0
SHORTNESS OF BREATH: 0
COUGH: 0

## 2023-06-22 NOTE — PROGRESS NOTES
PROGRESS NOTE    SUBJECTIVE:   Sandrita Duke is a 41 y.o. female seen for a follow up visit regarding the following chief complaint:     Chief Complaint   Patient presents with    Annual Exam    Discuss Labs           HPI patient presents office for complete physical states she is up-to-date with her Pap and pelvic but she wants to do saliva testing because of perimenopausal symptoms she had it done about 3 years ago      Past Medical History, Past Surgical History, Family history, Social History, and Medications were all reviewed with the patient today and updated as necessary. Current Outpatient Medications   Medication Sig Dispense Refill    Probiotic Product (CULTURELLE PROBIOTICS) CHEW       atorvastatin (LIPITOR) 10 MG tablet Take 1 tablet by mouth daily 90 tablet 3    ferrous sulfate (IRON 325) 325 (65 Fe) MG tablet Take 1 tablet by mouth daily (with breakfast)      vitamin D 25 MCG (1000 UT) CAPS Take 1 capsule by mouth daily       No current facility-administered medications for this visit.      No Known Allergies  Patient Active Problem List   Diagnosis    Low testosterone level in female    Vitamin D deficiency    Hypertension    Acne    Menopausal syndrome    Mixed hyperlipidemia     Past Medical History:   Diagnosis Date    Acne     Gestational diabetes     Hypertension     Insomnia with sleep apnea     Low testosterone level in female     Menopausal syndrome     Mixed hyperlipidemia     Vitamin D deficiency 11/05/2013    [facility] (Level 13)     Past Surgical History:   Procedure Laterality Date    BREAST REDUCTION SURGERY Bilateral 09/26/2013     Family History   Problem Relation Age of Onset    Breast Cancer Paternal Aunt 28    Elevated Lipids Mother     Hypertension Mother     Diabetes Father     Elevated Lipids Father     Hypertension Father     Diabetes Mother         Pre-diabetes    Stroke Father      Social History     Tobacco Use    Smoking status: Never     Passive exposure:

## 2023-07-16 ASSESSMENT — SOCIAL DETERMINANTS OF HEALTH (SDOH)
WITHIN THE LAST YEAR, HAVE YOU BEEN HUMILIATED OR EMOTIONALLY ABUSED IN OTHER WAYS BY YOUR PARTNER OR EX-PARTNER?: NO
WITHIN THE LAST YEAR, HAVE YOU BEEN KICKED, HIT, SLAPPED, OR OTHERWISE PHYSICALLY HURT BY YOUR PARTNER OR EX-PARTNER?: NO
WITHIN THE LAST YEAR, HAVE YOU BEEN AFRAID OF YOUR PARTNER OR EX-PARTNER?: NO
WITHIN THE LAST YEAR, HAVE TO BEEN RAPED OR FORCED TO HAVE ANY KIND OF SEXUAL ACTIVITY BY YOUR PARTNER OR EX-PARTNER?: NO

## 2023-07-19 ENCOUNTER — OFFICE VISIT (OUTPATIENT)
Dept: OBGYN CLINIC | Age: 48
End: 2023-07-19
Payer: OTHER GOVERNMENT

## 2023-07-19 VITALS
DIASTOLIC BLOOD PRESSURE: 78 MMHG | BODY MASS INDEX: 27.82 KG/M2 | WEIGHT: 167 LBS | SYSTOLIC BLOOD PRESSURE: 122 MMHG | HEIGHT: 65 IN

## 2023-07-19 DIAGNOSIS — R23.2 HOT FLASHES: ICD-10-CM

## 2023-07-19 DIAGNOSIS — N92.0 MENORRHAGIA WITH REGULAR CYCLE: Primary | ICD-10-CM

## 2023-07-19 DIAGNOSIS — R45.86 MOOD CHANGES: ICD-10-CM

## 2023-07-19 DIAGNOSIS — R68.82 LOW LIBIDO: ICD-10-CM

## 2023-07-19 PROCEDURE — 3078F DIAST BP <80 MM HG: CPT | Performed by: OBSTETRICS & GYNECOLOGY

## 2023-07-19 PROCEDURE — 3074F SYST BP LT 130 MM HG: CPT | Performed by: OBSTETRICS & GYNECOLOGY

## 2023-07-19 PROCEDURE — 99204 OFFICE O/P NEW MOD 45 MIN: CPT | Performed by: OBSTETRICS & GYNECOLOGY

## 2023-07-19 RX ORDER — NORELGESTROMIN AND ETHINYL ESTRADIOL 150; 35 UG/D; UG/D
1 PATCH TRANSDERMAL WEEKLY
Qty: 12 PATCH | Refills: 4 | Status: SHIPPED | OUTPATIENT
Start: 2023-07-19

## 2023-07-19 RX ORDER — NORELGESTROMIN AND ETHINYL ESTRADIOL 150; 35 UG/D; UG/D
1 PATCH TRANSDERMAL WEEKLY
Qty: 3 PATCH | Refills: 0 | Status: SHIPPED | OUTPATIENT
Start: 2023-07-19

## 2023-07-19 NOTE — PROGRESS NOTES
Patient presents today for   Chief Complaint   Patient presents with    New Patient    Other     Hormone imbalance , hot flashes, alcantara, low sex drive, weight gain, fatigue, ongoing 12 yrs,/ prev tx progestogen and testosterone creams, tx worked but insurance stopped covering        Reports regular monthly menses that are heavy flow  LMP: Patient's last menstrual period was 07/10/2023 (approximate). Contraception: none        No Known Allergies  Current Outpatient Medications   Medication Sig Dispense Refill    norelgestromin-ethinyl estradiol Alyssa Hoffmann) 150-35 MCG/24HR Place 1 patch onto the skin once a week (Patient not taking: Reported on 2023) 3 patch 0    norelgestromin-ethinyl estradiol [place] Hoffmann) 150-35 MCG/24HR Place 1 patch onto the skin once a week (Patient not taking: Reported on 2023) 12 patch 4    Probiotic Product (CULTURELLE PROBIOTICS) CHEW       atorvastatin (LIPITOR) 10 MG tablet Take 1 tablet by mouth daily 90 tablet 3    ferrous sulfate (IRON 325) 325 (65 Fe) MG tablet Take 1 tablet by mouth daily (with breakfast)      vitamin D 25 MCG (1000 UT) CAPS Take 1 capsule by mouth daily       No current facility-administered medications for this visit.      Past Medical History:   Diagnosis Date    Acne     Gestational diabetes     Hypertension     Insomnia with sleep apnea     Low testosterone level in female     Menopausal syndrome     Mixed hyperlipidemia     Vitamin D deficiency 2013    [facility] (Level 13)     Past Surgical History:   Procedure Laterality Date    BREAST REDUCTION SURGERY Bilateral 2013     SECTION      6, 2010     Social History     Socioeconomic History    Marital status:      Spouse name: Not on file    Number of children: Not on file    Years of education: Not on file    Highest education level: Not on file   Occupational History    Not on file   Tobacco Use    Smoking status: Never     Passive exposure: Never    Smokeless

## 2023-07-25 NOTE — PROGRESS NOTES
Name: João Arnett      MRN: [id number]       : 1975       Age: 41 y.o. Sex: female        [address] [facility],        CC:  No chief complaint on file. HPI:  The patient is referred here for a colonoscopy by Dr. Jinny Morgan. Never had one before. No recent abdominal pain, nausea or vomiting. Family hx of colon cancer No      Previous colonoscopy No   BRBPR No   Melena No   Loss of appetite No   Weight loss No      Change in bowel habits No       HISTORY:    Past Medical History:   Diagnosis Date    Acne     Gestational diabetes     Hypertension     Insomnia with sleep apnea     Low testosterone level in female     Menopausal syndrome     Mixed hyperlipidemia     Vitamin D deficiency 2013    [facility] (Level 13)     Past Surgical History:   Procedure Laterality Date    BREAST REDUCTION SURGERY Bilateral 2013     Prior to Admission medications    Medication Sig Start Date End Date Taking?  Authorizing Provider   norelgestromin-ethinyl estradiol Anselmo Almaguer) 150-35 MCG/24HR Place 1 patch onto the skin once a week 23   Karen Burns MD   norelgestromin-ethinyl estradiol Anselmo Almaguer) 150-35 MCG/24HR Place 1 patch onto the skin once a week 23   Karen Burns MD   Probiotic Product (Suri Hotter PROBIOTICS) CHEW  23   Historical Provider, MD   atorvastatin (LIPITOR) 10 MG tablet Take 1 tablet by mouth daily 23   Flores Soriano,    ferrous sulfate (IRON 325) 325 (65 Fe) MG tablet Take 1 tablet by mouth daily (with breakfast)    Historical Provider, MD   vitamin D 25 MCG (1000 UT) CAPS Take 1 capsule by mouth daily    Ar Automatic Reconciliation     Social History     Tobacco Use    Smoking status: Never     Passive exposure: Never    Smokeless tobacco: Never   Vaping Use    Vaping Use: Never used   Substance Use Topics    Alcohol use: Yes    Drug use: No     Family History   Problem Relation Age of Onset    Breast Cancer Paternal Aunt 28    Elevated Lipids

## 2023-07-27 ENCOUNTER — PREP FOR PROCEDURE (OUTPATIENT)
Dept: SURGERY | Age: 48
End: 2023-07-27

## 2023-07-27 ENCOUNTER — OFFICE VISIT (OUTPATIENT)
Dept: SURGERY | Age: 48
End: 2023-07-27

## 2023-07-27 VITALS
WEIGHT: 161.7 LBS | DIASTOLIC BLOOD PRESSURE: 82 MMHG | HEART RATE: 69 BPM | BODY MASS INDEX: 26.91 KG/M2 | SYSTOLIC BLOOD PRESSURE: 112 MMHG

## 2023-07-27 DIAGNOSIS — Z12.11 ENCOUNTER FOR SCREENING COLONOSCOPY: Primary | ICD-10-CM

## 2023-07-27 NOTE — H&P
Name: Pérez Joyce      MRN: [id number]       : 1975       Age: 41 y.o. Sex: female        [address] [facility],        CC:  No chief complaint on file. HPI:  The patient is referred here for a colonoscopy by Dr. All Cristobal. Never had one before. No recent abdominal pain, nausea or vomiting. Family hx of colon cancer No      Previous colonoscopy No   BRBPR No   Melena No   Loss of appetite No   Weight loss No      Change in bowel habits No       HISTORY:    Past Medical History:   Diagnosis Date    Acne     Gestational diabetes     Hypertension     Insomnia with sleep apnea     Low testosterone level in female     Menopausal syndrome     Mixed hyperlipidemia     Vitamin D deficiency 2013    [facility] (Level 13)     Past Surgical History:   Procedure Laterality Date    BREAST REDUCTION SURGERY Bilateral 2013     SECTION      2009, 2010     Prior to Admission medications    Medication Sig Start Date End Date Taking?  Authorizing Provider   norelgestromin-ethinyl estradiol [place]) 150-35 MCG/24HR Place 1 patch onto the skin once a week  Patient not taking: Reported on 2023   Kadeem Franklin MD   norelgestromin-ethinyl estradiol [place]) 150-35 MCG/24HR Place 1 patch onto the skin once a week  Patient not taking: Reported on 2023   Kadeem Franklin MD   Probiotic Product (CULTURELLE PROBIOTICS) [place]  23   Historical Provider, MD   atorvastatin (LIPITOR) 10 MG tablet Take 1 tablet by mouth daily 23   Niecy Raza DO   ferrous sulfate (IRON 325) 325 (65 Fe) MG tablet Take 1 tablet by mouth daily (with breakfast)    Historical Provider, MD   vitamin D 25 MCG (1000 UT) CAPS Take 1 capsule by mouth daily    Ar Automatic Reconciliation     Social History     Tobacco Use    Smoking status: Never     Passive exposure: Never    Smokeless tobacco: Never   Vaping Use    Vaping Use: Never used   Substance Use Topics

## 2023-08-01 ENCOUNTER — ANESTHESIA EVENT (OUTPATIENT)
Dept: ENDOSCOPY | Age: 48
End: 2023-08-01
Payer: OTHER GOVERNMENT

## 2023-08-01 RX ORDER — SODIUM CHLORIDE 0.9 % (FLUSH) 0.9 %
5-40 SYRINGE (ML) INJECTION EVERY 12 HOURS SCHEDULED
Status: CANCELLED | OUTPATIENT
Start: 2023-08-01

## 2023-08-01 RX ORDER — SODIUM CHLORIDE 0.9 % (FLUSH) 0.9 %
5-40 SYRINGE (ML) INJECTION PRN
Status: CANCELLED | OUTPATIENT
Start: 2023-08-01

## 2023-08-01 RX ORDER — ONDANSETRON 2 MG/ML
4 INJECTION INTRAMUSCULAR; INTRAVENOUS
Status: CANCELLED | OUTPATIENT
Start: 2023-08-01 | End: 2023-08-02

## 2023-08-01 RX ORDER — SODIUM CHLORIDE 9 MG/ML
INJECTION, SOLUTION INTRAVENOUS PRN
Status: CANCELLED | OUTPATIENT
Start: 2023-08-01

## 2023-08-01 NOTE — PROGRESS NOTES
Spoke with patient. Confirmed arrival time of 0700 for their 0831 procedure tomorrow. Discussed NPO status after midnight. Reviewed  policy and advised patient to register in the lobby prior to coming to the GI lab. Patient verbalized understanding.

## 2023-08-02 ENCOUNTER — ANESTHESIA (OUTPATIENT)
Dept: ENDOSCOPY | Age: 48
End: 2023-08-02
Payer: OTHER GOVERNMENT

## 2023-08-02 ENCOUNTER — HOSPITAL ENCOUNTER (OUTPATIENT)
Age: 48
Setting detail: OUTPATIENT SURGERY
Discharge: HOME | End: 2023-08-02
Attending: SURGERY | Admitting: SURGERY
Payer: OTHER GOVERNMENT

## 2023-08-02 VITALS
OXYGEN SATURATION: 100 % | DIASTOLIC BLOOD PRESSURE: 79 MMHG | SYSTOLIC BLOOD PRESSURE: 122 MMHG | WEIGHT: 161 LBS | RESPIRATION RATE: 16 BRPM | BODY MASS INDEX: 26.82 KG/M2 | HEART RATE: 54 BPM | HEIGHT: 65 IN | TEMPERATURE: 98 F

## 2023-08-02 DIAGNOSIS — Z12.11 ENCOUNTER FOR SCREENING COLONOSCOPY: ICD-10-CM

## 2023-08-02 PROCEDURE — 2709999900 HC NON-CHARGEABLE SUPPLY: Performed by: SURGERY

## 2023-08-02 PROCEDURE — 3700000000 HC ANESTHESIA ATTENDED CARE: Performed by: SURGERY

## 2023-08-02 PROCEDURE — 3609027000 HC COLONOSCOPY: Performed by: SURGERY

## 2023-08-02 PROCEDURE — 2500000003 HC RX 250 WO HCPCS: Performed by: ANESTHESIOLOGY

## 2023-08-02 PROCEDURE — 7100000010 HC PHASE II RECOVERY - FIRST 15 MIN: Performed by: SURGERY

## 2023-08-02 PROCEDURE — A4216 STERILE WATER/SALINE, 10 ML: HCPCS | Performed by: ANESTHESIOLOGY

## 2023-08-02 PROCEDURE — 6360000002 HC RX W HCPCS: Performed by: NURSE ANESTHETIST, CERTIFIED REGISTERED

## 2023-08-02 PROCEDURE — 2580000003 HC RX 258: Performed by: ANESTHESIOLOGY

## 2023-08-02 PROCEDURE — 7100000011 HC PHASE II RECOVERY - ADDTL 15 MIN: Performed by: SURGERY

## 2023-08-02 PROCEDURE — 2580000003 HC RX 258: Performed by: NURSE ANESTHETIST, CERTIFIED REGISTERED

## 2023-08-02 PROCEDURE — 2500000003 HC RX 250 WO HCPCS: Performed by: NURSE ANESTHETIST, CERTIFIED REGISTERED

## 2023-08-02 PROCEDURE — 3700000001 HC ADD 15 MINUTES (ANESTHESIA): Performed by: SURGERY

## 2023-08-02 PROCEDURE — 45378 DIAGNOSTIC COLONOSCOPY: CPT | Performed by: SURGERY

## 2023-08-02 RX ORDER — SODIUM CHLORIDE 0.9 % (FLUSH) 0.9 %
5-40 SYRINGE (ML) INJECTION EVERY 12 HOURS SCHEDULED
Status: DISCONTINUED | OUTPATIENT
Start: 2023-08-02 | End: 2023-08-02 | Stop reason: HOSPADM

## 2023-08-02 RX ORDER — SODIUM CHLORIDE, SODIUM LACTATE, POTASSIUM CHLORIDE, CALCIUM CHLORIDE 600; 310; 30; 20 MG/100ML; MG/100ML; MG/100ML; MG/100ML
INJECTION, SOLUTION INTRAVENOUS CONTINUOUS
Status: DISCONTINUED | OUTPATIENT
Start: 2023-08-02 | End: 2023-08-02 | Stop reason: HOSPADM

## 2023-08-02 RX ORDER — SODIUM CHLORIDE 0.9 % (FLUSH) 0.9 %
5-40 SYRINGE (ML) INJECTION PRN
Status: DISCONTINUED | OUTPATIENT
Start: 2023-08-02 | End: 2023-08-02 | Stop reason: HOSPADM

## 2023-08-02 RX ORDER — SODIUM CHLORIDE 9 MG/ML
INJECTION, SOLUTION INTRAVENOUS PRN
Status: DISCONTINUED | OUTPATIENT
Start: 2023-08-02 | End: 2023-08-02 | Stop reason: HOSPADM

## 2023-08-02 RX ORDER — SODIUM CHLORIDE, SODIUM LACTATE, POTASSIUM CHLORIDE, CALCIUM CHLORIDE 600; 310; 30; 20 MG/100ML; MG/100ML; MG/100ML; MG/100ML
INJECTION, SOLUTION INTRAVENOUS CONTINUOUS PRN
Status: DISCONTINUED | OUTPATIENT
Start: 2023-08-02 | End: 2023-08-02 | Stop reason: SDUPTHER

## 2023-08-02 RX ORDER — LIDOCAINE HYDROCHLORIDE 20 MG/ML
INJECTION, SOLUTION EPIDURAL; INFILTRATION; INTRACAUDAL; PERINEURAL PRN
Status: DISCONTINUED | OUTPATIENT
Start: 2023-08-02 | End: 2023-08-02 | Stop reason: SDUPTHER

## 2023-08-02 RX ORDER — PROPOFOL 10 MG/ML
INJECTION, EMULSION INTRAVENOUS PRN
Status: DISCONTINUED | OUTPATIENT
Start: 2023-08-02 | End: 2023-08-02 | Stop reason: SDUPTHER

## 2023-08-02 RX ORDER — LIDOCAINE HYDROCHLORIDE 10 MG/ML
1 INJECTION, SOLUTION INFILTRATION; PERINEURAL
Status: DISCONTINUED | OUTPATIENT
Start: 2023-08-02 | End: 2023-08-02 | Stop reason: HOSPADM

## 2023-08-02 RX ADMIN — FAMOTIDINE 20 MG: 10 INJECTION, SOLUTION INTRAVENOUS at 07:22

## 2023-08-02 RX ADMIN — SODIUM CHLORIDE, POTASSIUM CHLORIDE, SODIUM LACTATE AND CALCIUM CHLORIDE: 600; 310; 30; 20 INJECTION, SOLUTION INTRAVENOUS at 07:18

## 2023-08-02 RX ADMIN — PROPOFOL 80 MG: 10 INJECTION, EMULSION INTRAVENOUS at 08:29

## 2023-08-02 RX ADMIN — PROPOFOL 150 MCG/KG/MIN: 10 INJECTION, EMULSION INTRAVENOUS at 08:30

## 2023-08-02 RX ADMIN — LIDOCAINE HYDROCHLORIDE 50 MG: 20 INJECTION, SOLUTION EPIDURAL; INFILTRATION; INTRACAUDAL; PERINEURAL at 08:27

## 2023-08-02 RX ADMIN — SODIUM CHLORIDE, SODIUM LACTATE, POTASSIUM CHLORIDE, AND CALCIUM CHLORIDE: 600; 310; 30; 20 INJECTION, SOLUTION INTRAVENOUS at 08:24

## 2023-08-02 ASSESSMENT — PAIN - FUNCTIONAL ASSESSMENT: PAIN_FUNCTIONAL_ASSESSMENT: NONE - DENIES PAIN

## 2023-08-02 NOTE — OP NOTE
[address] [facility]  OPERATIVE REPORT    Name:  Mark Rose  MR#:  [id number]  :  1975  ACCOUNT #:  [de-identified]  DATE OF SERVICE:  2023    PREOPERATIVE DIAGNOSIS:  Request for screening colonoscopy. POSTOPERATIVE DIAGNOSES:  1. Good bowel prep. 2.  No masses, polyps. PROCEDURE PERFORMED:  Colonoscopy. SURGEON:  Jaylon Ledesma MD    ASSISTANT:  None. ANESTHESIA:  Monitored anesthesia care with Dr. Rao Cho and Lex Zamorano, the [facility]. COMPLICATIONS:  None. SPECIMENS REMOVED:  None. IMPLANTS:  None. ESTIMATED BLOOD LOSS:  None. DRAINS:  None. HISTORY:  A 19-year-old female who I was asked see by Dr. Mitch Escalante for a colonoscopy. 41years old never had a colonoscopy before, and denied melena, bright red blood per rectum, weight loss, loss of appetite, change in bowel habits or family history of colon or rectal cancer. PROCEDURE:  She underwent a bowel prep on 2023, came into the [address] [place] [address] for the procedure today. She was seen with her  in the preop area, then transported to room #5. She was placed on a stretcher in left lateral decubitus position. Oxygen via nasal cannula was administered. The O2 sats, vital signs were monitored by the Anesthesia staff throughout the procedure. I did a time-out identifying the patient, surgical procedure and birth date of 1975. When everyone in the room agreed with the time-out, we began the procedure. I inserted an adult colonoscope from the anus all the way to the cecum. Cecum was identified with the ileocecal valve, cecal folds. External compression of right lower quadrant corresponded to an indentation seen with the scope. Appendiceal orifice was visualized. Scope was slowly withdrawn over a 6-minute period of time. The overall bowel prep was good. There were no lesions noted in the cecum, ascending colon, transverse colon, descending or sigmoid colon.   Scope was

## 2023-08-02 NOTE — ANESTHESIA POSTPROCEDURE EVALUATION
Department of Anesthesiology  Postprocedure Note    Patient: Roxie Suárez  MRN: [id number]  YOB: 1975  Date of evaluation: 8/2/2023      Procedure Summary     Date: 08/02/23 Room / Location: [facility] ENDO 05 / [facility] ENDOSCOPY    Anesthesia Start: 7022 Anesthesia Stop: 3140    Procedure: COLORECTAL CANCER SCREENING, NOT HIGH RISK / BMI 27 Diagnosis:       Encounter for screening colonoscopy      (Encounter for screening colonoscopy [Z12.11])    Surgeons: Lana Olmos MD Responsible Provider: Dave Desir MD    Anesthesia Type: TIVA ASA Status: 2          Anesthesia Type: No value filed.     Ezequiel Phase I: Ezequiel Score: 10    Ezequiel Phase II: Ezequiel Score: 10      Anesthesia Post Evaluation    Patient location during evaluation: PACU  Patient participation: complete - patient participated  Level of consciousness: awake and alert  Airway patency: patent  Nausea & Vomiting: no nausea  Cardiovascular status: hemodynamically stable  Respiratory status: acceptable  Hydration status: euvolemic  Pain management: adequate and satisfactory to patient

## 2023-08-02 NOTE — INTERVAL H&P NOTE
Update History & Physical    The patient's History and Physical of 7/27/23 was reviewed with the patient and I examined the patient. There was no change. The surgical site was confirmed by the patient and me. Plan: The risks, benefits, expected outcome, and alternative to the recommended procedure have been discussed with the patient. Patient understands and wants to proceed with the procedure.      Electronically signed by Cyrena Goldmann, MD on 8/2/2023 at 7:07 AM

## 2023-08-02 NOTE — ANESTHESIA PRE PROCEDURE
Endo/Other:        Diabetes: gest only. Abdominal:             Vascular: negative vascular ROS. Other Findings:           Anesthesia Plan      TIVA     ASA 2       Induction: intravenous. Anesthetic plan and risks discussed with patient. Plan discussed with CRNA.                     Lorelei Logan MD   8/2/2023

## 2023-08-26 PROBLEM — Z12.11 ENCOUNTER FOR SCREENING COLONOSCOPY: Status: RESOLVED | Noted: 2023-07-27 | Resolved: 2023-08-26

## 2023-08-29 ENCOUNTER — OFFICE VISIT (OUTPATIENT)
Dept: OBGYN CLINIC | Age: 48
End: 2023-08-29
Payer: OTHER GOVERNMENT

## 2023-08-29 VITALS
BODY MASS INDEX: 27.66 KG/M2 | DIASTOLIC BLOOD PRESSURE: 74 MMHG | SYSTOLIC BLOOD PRESSURE: 126 MMHG | WEIGHT: 166 LBS | HEIGHT: 65 IN

## 2023-08-29 DIAGNOSIS — Z11.51 SCREENING FOR HUMAN PAPILLOMAVIRUS (HPV): ICD-10-CM

## 2023-08-29 DIAGNOSIS — Z12.31 SCREENING MAMMOGRAM FOR BREAST CANCER: ICD-10-CM

## 2023-08-29 DIAGNOSIS — N92.0 MENORRHAGIA WITH REGULAR CYCLE: ICD-10-CM

## 2023-08-29 DIAGNOSIS — Z01.419 WELL WOMAN EXAM WITH ROUTINE GYNECOLOGICAL EXAM: Primary | ICD-10-CM

## 2023-08-29 DIAGNOSIS — Z12.4 SCREENING FOR CERVICAL CANCER: ICD-10-CM

## 2023-08-29 PROCEDURE — 3078F DIAST BP <80 MM HG: CPT | Performed by: OBSTETRICS & GYNECOLOGY

## 2023-08-29 PROCEDURE — 99396 PREV VISIT EST AGE 40-64: CPT | Performed by: OBSTETRICS & GYNECOLOGY

## 2023-08-29 PROCEDURE — 3074F SYST BP LT 130 MM HG: CPT | Performed by: OBSTETRICS & GYNECOLOGY

## 2023-08-29 RX ORDER — NORELGESTROMIN AND ETHINYL ESTRADIOL 150; 35 UG/D; UG/D
1 PATCH TRANSDERMAL WEEKLY
Qty: 9 PATCH | Refills: 4 | Status: SHIPPED | OUTPATIENT
Start: 2023-08-29

## 2023-08-29 NOTE — PROGRESS NOTES
Patient presents today for   Chief Complaint   Patient presents with    Annual Exam         LMP: Patient's last menstrual period was 2023 (exact date). Contraception:  combination patch weekly (Xulane)        No Known Allergies  Current Outpatient Medications   Medication Sig Dispense Refill    norelgestromin-ethinyl estradiol Salma Sers) 150-35 MCG/24HR Place 1 patch onto the skin once a week 9 patch 4    Probiotic Product (CULTURELLE PROBIOTICS) CHEW       atorvastatin (LIPITOR) 10 MG tablet Take 1 tablet by mouth daily 90 tablet 3    ferrous sulfate (IRON 325) 325 (65 Fe) MG tablet Take 1 tablet by mouth daily (with breakfast)      vitamin D 25 MCG (1000 UT) CAPS Take 1 capsule by mouth daily       No current facility-administered medications for this visit.      Past Medical History:   Diagnosis Date    Acne     Gestational diabetes     Hypertension     Insomnia with sleep apnea     Low testosterone level in female     Menopausal syndrome     Mixed hyperlipidemia     Vitamin D deficiency 2013    [facility] (Level 13)     Past Surgical History:   Procedure Laterality Date    BREAST REDUCTION SURGERY Bilateral 2013     SECTION      6.12., 2010    COLONOSCOPY N/A 2023    COLORECTAL CANCER SCREENING, NOT HIGH RISK / BMI 27 performed by Jenni Jaramillo MD at [facility] ENDOSCOPY     Social History     Socioeconomic History    Marital status:      Spouse name: Not on file    Number of children: Not on file    Years of education: Not on file    Highest education level: Not on file   Occupational History    Not on file   Tobacco Use    Smoking status: Never     Passive exposure: Never    Smokeless tobacco: Never   Vaping Use    Vaping Use: Never used   Substance and Sexual Activity    Alcohol use: Yes    Drug use: No    Sexual activity: Not on file   Other Topics Concern    Not on file   Social History Narrative    Not on file     Social Determinants of Health     Financial

## 2023-09-05 LAB
CYTOLOGIST CVX/VAG CYTO: NORMAL
CYTOLOGY CVX/VAG DOC THIN PREP: NORMAL
HPV APTIMA: NEGATIVE
Lab: NORMAL
Lab: NORMAL
PATH REPORT.FINAL DX SPEC: NORMAL
STAT OF ADQ CVX/VAG CYTO-IMP: NORMAL

## 2023-09-23 ENCOUNTER — HOSPITAL ENCOUNTER (OUTPATIENT)
Dept: MAMMOGRAPHY | Age: 48
End: 2023-09-23
Attending: OBSTETRICS & GYNECOLOGY
Payer: OTHER GOVERNMENT

## 2023-09-23 DIAGNOSIS — Z12.31 SCREENING MAMMOGRAM FOR BREAST CANCER: ICD-10-CM

## 2023-09-23 PROCEDURE — 77063 BREAST TOMOSYNTHESIS BI: CPT

## 2024-01-23 ASSESSMENT — PATIENT HEALTH QUESTIONNAIRE - PHQ9
2. FEELING DOWN, DEPRESSED OR HOPELESS: NOT AT ALL
2. FEELING DOWN, DEPRESSED OR HOPELESS: 0
SUM OF ALL RESPONSES TO PHQ QUESTIONS 1-9: 0
SUM OF ALL RESPONSES TO PHQ9 QUESTIONS 1 & 2: 0
SUM OF ALL RESPONSES TO PHQ QUESTIONS 1-9: 0
SUM OF ALL RESPONSES TO PHQ9 QUESTIONS 1 & 2: 0
SUM OF ALL RESPONSES TO PHQ QUESTIONS 1-9: 0
1. LITTLE INTEREST OR PLEASURE IN DOING THINGS: NOT AT ALL
SUM OF ALL RESPONSES TO PHQ QUESTIONS 1-9: 0
1. LITTLE INTEREST OR PLEASURE IN DOING THINGS: 0

## 2024-01-26 ENCOUNTER — OFFICE VISIT (OUTPATIENT)
Dept: FAMILY MEDICINE CLINIC | Facility: CLINIC | Age: 49
End: 2024-01-26
Payer: OTHER GOVERNMENT

## 2024-01-26 VITALS
BODY MASS INDEX: 28.49 KG/M2 | HEIGHT: 65 IN | WEIGHT: 171 LBS | SYSTOLIC BLOOD PRESSURE: 140 MMHG | HEART RATE: 62 BPM | DIASTOLIC BLOOD PRESSURE: 80 MMHG

## 2024-01-26 DIAGNOSIS — L30.9 ECZEMA, UNSPECIFIED TYPE: Primary | ICD-10-CM

## 2024-01-26 DIAGNOSIS — J30.89 ENVIRONMENTAL AND SEASONAL ALLERGIES: ICD-10-CM

## 2024-01-26 PROCEDURE — 3079F DIAST BP 80-89 MM HG: CPT | Performed by: FAMILY MEDICINE

## 2024-01-26 PROCEDURE — 3077F SYST BP >= 140 MM HG: CPT | Performed by: FAMILY MEDICINE

## 2024-01-26 PROCEDURE — 99213 OFFICE O/P EST LOW 20 MIN: CPT | Performed by: FAMILY MEDICINE

## 2024-01-26 ASSESSMENT — ENCOUNTER SYMPTOMS
SHORTNESS OF BREATH: 0
VOMITING: 0
NAUSEA: 0

## 2024-01-26 NOTE — PROGRESS NOTES
Breast Cancer Paternal Aunt 35    Elevated Lipids Mother     Hypertension Mother     Diabetes Father     Elevated Lipids Father     Hypertension Father     Diabetes Mother         Pre-diabetes    Stroke Father      Social History     Tobacco Use    Smoking status: Never     Passive exposure: Never    Smokeless tobacco: Never   Substance Use Topics    Alcohol use: Yes         Review of Systems   Constitutional:  Negative for fatigue and fever.   Respiratory:  Negative for shortness of breath.    Cardiovascular:  Negative for chest pain.   Gastrointestinal:  Negative for nausea and vomiting.         OBJECTIVE:  BP (!) 140/80 (Site: Left Upper Arm, Position: Sitting, Cuff Size: Large Adult)   Pulse 62   Ht 1.651 m (5' 5\")   Wt 77.6 kg (171 lb)   BMI 28.46 kg/m²      Physical Exam  Vitals and nursing note reviewed.   Constitutional:       Appearance: Normal appearance.   Eyes:      Pupils: Pupils are equal, round, and reactive to light.   Cardiovascular:      Rate and Rhythm: Normal rate and regular rhythm.   Pulmonary:      Effort: Pulmonary effort is normal.      Breath sounds: Normal breath sounds.   Neurological:      Mental Status: She is alert.   Psychiatric:         Mood and Affect: Mood normal.         Behavior: Behavior normal.          Medical problems and test results were reviewed with the patient today.     No results found for this or any previous visit (from the past 672 hour(s)).    ASSESSMENT and PLAN    Visit Diagnoses and Associated Orders       Eczema, unspecified type    -  Primary                     Diagnosis Orders   1. Eczema, unspecified type  Yandel Yadav MD, Allergy & Immunology, [place]      2. Environmental and seasonal allergies  Yandel Yadav MD, Allergy & Immunology, [place]      , Wendy was seen today for other.    Diagnoses and all orders for this visit:    Eczema, unspecified type  -     Yandel Yadav MD, Allergy & Immunology,

## 2024-04-23 ENCOUNTER — TELEPHONE (OUTPATIENT)
Dept: FAMILY MEDICINE CLINIC | Facility: CLINIC | Age: 49
End: 2024-04-23

## 2024-04-23 NOTE — TELEPHONE ENCOUNTER
Patient saw  doctor in [place] today. Her blood pressure was taken a few times and was high each time. 174-90 and 184/111. The doctor she saw also heard a heart murmur and suggested she see a cardiologist to get an echo done.   Offered an appointment today. Patient declined due to her being out of town. Scheduled an appointment for Thursday. Patient notified that if she gets HA, slurred speech, SOB, chest, arm pain etc needs to go to ER. Patient verbalized understanding.

## 2024-04-25 ENCOUNTER — OFFICE VISIT (OUTPATIENT)
Dept: FAMILY MEDICINE CLINIC | Facility: CLINIC | Age: 49
End: 2024-04-25
Payer: OTHER GOVERNMENT

## 2024-04-25 VITALS
HEIGHT: 65 IN | SYSTOLIC BLOOD PRESSURE: 152 MMHG | BODY MASS INDEX: 28.16 KG/M2 | DIASTOLIC BLOOD PRESSURE: 100 MMHG | HEART RATE: 60 BPM | WEIGHT: 169 LBS

## 2024-04-25 DIAGNOSIS — R01.1 MURMUR: ICD-10-CM

## 2024-04-25 DIAGNOSIS — E55.9 VITAMIN D DEFICIENCY: Primary | ICD-10-CM

## 2024-04-25 DIAGNOSIS — R79.89 LOW TESTOSTERONE LEVEL IN FEMALE: ICD-10-CM

## 2024-04-25 DIAGNOSIS — N95.1 MENOPAUSAL SYNDROME: ICD-10-CM

## 2024-04-25 DIAGNOSIS — R53.83 OTHER FATIGUE: ICD-10-CM

## 2024-04-25 DIAGNOSIS — E78.2 MIXED HYPERLIPIDEMIA: ICD-10-CM

## 2024-04-25 DIAGNOSIS — I10 ESSENTIAL (PRIMARY) HYPERTENSION: ICD-10-CM

## 2024-04-25 DIAGNOSIS — D50.9 IRON DEFICIENCY ANEMIA, UNSPECIFIED IRON DEFICIENCY ANEMIA TYPE: ICD-10-CM

## 2024-04-25 LAB
ESTRADIOL SERPL-MCNC: 42.6 PG/ML
PROGEST SERPL-MCNC: <0.05 NG/ML
TSH W FREE THYROID IF ABNORMAL: 1.28 UIU/ML (ref 0.27–4.2)

## 2024-04-25 PROCEDURE — 3077F SYST BP >= 140 MM HG: CPT | Performed by: FAMILY MEDICINE

## 2024-04-25 PROCEDURE — 99214 OFFICE O/P EST MOD 30 MIN: CPT | Performed by: FAMILY MEDICINE

## 2024-04-25 PROCEDURE — 3080F DIAST BP >= 90 MM HG: CPT | Performed by: FAMILY MEDICINE

## 2024-04-25 RX ORDER — FERROUS SULFATE 325(65) MG
325 TABLET ORAL
Qty: 90 TABLET | Refills: 3 | Status: SHIPPED | OUTPATIENT
Start: 2024-04-25

## 2024-04-25 RX ORDER — VALSARTAN AND HYDROCHLOROTHIAZIDE 160; 12.5 MG/1; MG/1
1 TABLET, FILM COATED ORAL DAILY
Qty: 90 TABLET | Refills: 0 | Status: SHIPPED | OUTPATIENT
Start: 2024-04-25

## 2024-04-25 RX ORDER — ATORVASTATIN CALCIUM 10 MG/1
10 TABLET, FILM COATED ORAL DAILY
Qty: 90 TABLET | Refills: 3 | Status: SHIPPED | OUTPATIENT
Start: 2024-04-25

## 2024-04-25 ASSESSMENT — ENCOUNTER SYMPTOMS
ABDOMINAL PAIN: 0
COUGH: 0
SHORTNESS OF BREATH: 0
DIARRHEA: 0
RHINORRHEA: 0
SINUS PAIN: 0

## 2024-04-25 NOTE — PROGRESS NOTES
Testosterone, free, total; Future  -     Estradiol; Future  -     Progesterone; Future  -     Salivary Cortisol; Future  -     TSH with Reflex; Future    Low testosterone level in female  -     Testosterone, free, total; Future    Menopausal syndrome  -     Testosterone, free, total; Future  -     Estradiol; Future  -     Progesterone; Future  -     Salivary Cortisol; Future  -     TSH with Reflex; Future    Essential (primary) hypertension  -     valsartan-hydroCHLOROthiazide (DIOVAN HCT) 160-12.5 MG per tablet; Take 1 tablet by mouth daily    Murmur  -     Fetal echo limited; Future    , Refilled her meds will recheck labs will get an echo we will start her on Diovan and I will have her return back for follow-up of her lab work follow-up of her echocardiogram recheck of her blood pressure note provided for [facility] [employer] in the meantime

## 2024-04-26 ENCOUNTER — TELEPHONE (OUTPATIENT)
Dept: FAMILY MEDICINE CLINIC | Facility: CLINIC | Age: 49
End: 2024-04-26

## 2024-04-26 DIAGNOSIS — R01.1 MURMUR: Primary | ICD-10-CM

## 2024-04-29 DIAGNOSIS — R53.83 OTHER FATIGUE: ICD-10-CM

## 2024-04-29 DIAGNOSIS — N95.1 MENOPAUSAL SYNDROME: ICD-10-CM

## 2024-04-30 LAB
TESTOST FREE SERPL-MCNC: 0.6 PG/ML (ref 0–4.2)
TESTOST SERPL-MCNC: 8 NG/DL (ref 4–50)

## 2024-05-04 LAB — CORTIS SAL-MCNC: 0.04 UG/DL

## 2024-06-03 ENCOUNTER — OFFICE VISIT (OUTPATIENT)
Dept: FAMILY MEDICINE CLINIC | Facility: CLINIC | Age: 49
End: 2024-06-03
Payer: OTHER GOVERNMENT

## 2024-06-03 VITALS
HEART RATE: 64 BPM | WEIGHT: 165 LBS | BODY MASS INDEX: 27.49 KG/M2 | SYSTOLIC BLOOD PRESSURE: 142 MMHG | DIASTOLIC BLOOD PRESSURE: 92 MMHG | HEIGHT: 65 IN

## 2024-06-03 DIAGNOSIS — I10 ESSENTIAL (PRIMARY) HYPERTENSION: Primary | ICD-10-CM

## 2024-06-03 PROCEDURE — 99214 OFFICE O/P EST MOD 30 MIN: CPT | Performed by: FAMILY MEDICINE

## 2024-06-03 PROCEDURE — 3077F SYST BP >= 140 MM HG: CPT | Performed by: FAMILY MEDICINE

## 2024-06-03 PROCEDURE — 3080F DIAST BP >= 90 MM HG: CPT | Performed by: FAMILY MEDICINE

## 2024-06-03 ASSESSMENT — ENCOUNTER SYMPTOMS
VOMITING: 0
NAUSEA: 0
SHORTNESS OF BREATH: 0

## 2024-06-03 NOTE — PROGRESS NOTES
PROGRESS NOTE    SUBJECTIVE:   Wendy Gutierrez is a 48 y.o. female seen for a follow up visit regarding the following chief complaint:     No chief complaint on file.          HPI patient presents to the office today to go over her echocardiogram and recheck her blood pressure patient stopped taking her hypertensive medication and/or never took it.  Recently this apparently started after [facility] said that she had a murmur and elevated hypertension and they did not treat her so she came here for treatment we started her on medication which again she never took but had the echocardiogram done which was completely normal      Past Medical History, Past Surgical History, Family history, Social History, and Medications were all reviewed with the patient today and updated as necessary.       Current Outpatient Medications   Medication Sig Dispense Refill    atorvastatin (LIPITOR) 10 MG tablet Take 1 tablet by mouth daily 90 tablet 3    ferrous sulfate (IRON 325) 325 (65 Fe) MG tablet Take 1 tablet by mouth daily (with breakfast) 90 tablet 3    Probiotic Product (CULTURELLE PROBIOTICS) CHEW       vitamin D 25 MCG (1000 UT) CAPS Take 1 capsule by mouth daily       No current facility-administered medications for this visit.     No Known Allergies  Patient Active Problem List   Diagnosis    Low testosterone level in female    Vitamin D deficiency    Hypertension    Acne    Menopausal syndrome    Mixed hyperlipidemia     Past Medical History:   Diagnosis Date    Acne     Gestational diabetes     Hypertension     Low testosterone level in female     Menopausal syndrome     Mixed hyperlipidemia     Vitamin D deficiency 2013    [facility] (Level 13)     Past Surgical History:   Procedure Laterality Date    BREAST REDUCTION SURGERY Bilateral 2013     SECTION      6.., 2010    COLONOSCOPY N/A 2023    COLORECTAL CANCER SCREENING, NOT HIGH RISK / BMI 27 performed by Leo

## 2024-07-01 ENCOUNTER — NURSE ONLY (OUTPATIENT)
Dept: FAMILY MEDICINE CLINIC | Facility: CLINIC | Age: 49
End: 2024-07-01
Payer: OTHER GOVERNMENT

## 2024-07-01 DIAGNOSIS — Z00.00 LABORATORY EXAMINATION ORDERED AS PART OF A ROUTINE GENERAL MEDICAL EXAMINATION: Primary | ICD-10-CM

## 2024-07-01 DIAGNOSIS — E55.9 VITAMIN D DEFICIENCY: ICD-10-CM

## 2024-07-01 LAB
25(OH)D3 SERPL-MCNC: 71.5 NG/ML (ref 30–100)
ALBUMIN SERPL-MCNC: 3.8 G/DL (ref 3.5–5)
ALBUMIN/GLOB SERPL: 1.4 (ref 1–1.9)
ALP SERPL-CCNC: 53 U/L (ref 35–104)
ALT SERPL-CCNC: 12 U/L (ref 12–65)
ANION GAP SERPL CALC-SCNC: 9 MMOL/L (ref 9–18)
AST SERPL-CCNC: 23 U/L (ref 15–37)
BILIRUB SERPL-MCNC: 0.6 MG/DL (ref 0–1.2)
BILIRUBIN, URINE, POC: NEGATIVE
BLOOD URINE, POC: NEGATIVE
BUN SERPL-MCNC: 12 MG/DL (ref 6–23)
CALCIUM SERPL-MCNC: 9.7 MG/DL (ref 8.8–10.2)
CHLORIDE SERPL-SCNC: 103 MMOL/L (ref 98–107)
CHOLEST SERPL-MCNC: 190 MG/DL (ref 0–200)
CO2 SERPL-SCNC: 26 MMOL/L (ref 20–28)
CREAT SERPL-MCNC: 1.17 MG/DL (ref 0.6–1.1)
GLOBULIN SER CALC-MCNC: 2.6 G/DL (ref 2.3–3.5)
GLUCOSE SERPL-MCNC: 96 MG/DL (ref 70–99)
GLUCOSE URINE, POC: NEGATIVE
GRANS ABSOLUTE, POC: 3 K/UL
GRANULOCYTES %, POC: 51.8 %
HDLC SERPL-MCNC: 78 MG/DL (ref 40–60)
HDLC SERPL: 2.4 (ref 0–5)
HEMATOCRIT, POC: 42.2 %
HEMOGLOBIN, POC: 13.7 G/DL
KETONES, URINE, POC: NEGATIVE
LDLC SERPL CALC-MCNC: 99 MG/DL (ref 0–100)
LEUKOCYTE ESTERASE, URINE, POC: NEGATIVE
LYMPHOCYTE %, POC: 39.5 %
LYMPHS ABSOLUTE, POC: 2.3 K/UL
MCH, POC: NORMAL PG (ref 40–?)
MCHC, POC: 32.5
MCV, POC: 94.8
MONOCYTE %, POC: 8.7 %
MONOCYTE, ABSOLUTE POC: 0.5 K/UL
MPV, POC: 9.1 FL
NITRITE, URINE, POC: NEGATIVE
PH, URINE, POC: 5.5 (ref 4.6–8)
PLATELET COUNT, POC: 205 K/UL
POTASSIUM SERPL-SCNC: 3.9 MMOL/L (ref 3.5–5.1)
PROT SERPL-MCNC: 6.5 G/DL (ref 6.3–8.2)
PROTEIN,URINE, POC: NEGATIVE
RBC, POC: 4.29 M/UL
RDW, POC: 12.2 %
SODIUM SERPL-SCNC: 138 MMOL/L (ref 136–145)
SPECIFIC GRAVITY, URINE, POC: 1 (ref 1–1.03)
TRIGL SERPL-MCNC: 64 MG/DL (ref 0–150)
TSH, 3RD GENERATION: 1.14 UIU/ML (ref 0.27–4.2)
URINALYSIS CLARITY, POC: CLEAR
URINALYSIS COLOR, POC: YELLOW
UROBILINOGEN, POC: NORMAL
VLDLC SERPL CALC-MCNC: 13 MG/DL (ref 6–23)
WBC, POC: 5.7 K/UL

## 2024-07-01 PROCEDURE — 36415 COLL VENOUS BLD VENIPUNCTURE: CPT | Performed by: FAMILY MEDICINE

## 2024-07-01 PROCEDURE — 81003 URINALYSIS AUTO W/O SCOPE: CPT | Performed by: FAMILY MEDICINE

## 2024-07-01 PROCEDURE — 85025 COMPLETE CBC W/AUTO DIFF WBC: CPT | Performed by: FAMILY MEDICINE

## 2024-07-22 ENCOUNTER — OFFICE VISIT (OUTPATIENT)
Dept: FAMILY MEDICINE CLINIC | Facility: CLINIC | Age: 49
End: 2024-07-22
Payer: OTHER GOVERNMENT

## 2024-07-22 VITALS
HEART RATE: 61 BPM | BODY MASS INDEX: 26.99 KG/M2 | WEIGHT: 162 LBS | HEIGHT: 65 IN | OXYGEN SATURATION: 97 % | DIASTOLIC BLOOD PRESSURE: 76 MMHG | SYSTOLIC BLOOD PRESSURE: 138 MMHG

## 2024-07-22 DIAGNOSIS — Z00.00 ROUTINE GENERAL MEDICAL EXAMINATION AT A HEALTH CARE FACILITY: Primary | ICD-10-CM

## 2024-07-22 DIAGNOSIS — E55.9 VITAMIN D DEFICIENCY: ICD-10-CM

## 2024-07-22 DIAGNOSIS — R01.1 MURMUR: ICD-10-CM

## 2024-07-22 DIAGNOSIS — Z12.31 SCREENING MAMMOGRAM FOR HIGH-RISK PATIENT: ICD-10-CM

## 2024-07-22 DIAGNOSIS — D50.9 IRON DEFICIENCY ANEMIA, UNSPECIFIED IRON DEFICIENCY ANEMIA TYPE: ICD-10-CM

## 2024-07-22 DIAGNOSIS — Z13.31 SCREENING FOR DEPRESSION: ICD-10-CM

## 2024-07-22 DIAGNOSIS — I10 ESSENTIAL (PRIMARY) HYPERTENSION: ICD-10-CM

## 2024-07-22 DIAGNOSIS — Z00.00 ENCOUNTER FOR WELL ADULT EXAM WITHOUT ABNORMAL FINDINGS: ICD-10-CM

## 2024-07-22 DIAGNOSIS — E78.2 MIXED HYPERLIPIDEMIA: ICD-10-CM

## 2024-07-22 PROCEDURE — 3075F SYST BP GE 130 - 139MM HG: CPT | Performed by: FAMILY MEDICINE

## 2024-07-22 PROCEDURE — 99396 PREV VISIT EST AGE 40-64: CPT | Performed by: FAMILY MEDICINE

## 2024-07-22 PROCEDURE — 3078F DIAST BP <80 MM HG: CPT | Performed by: FAMILY MEDICINE

## 2024-07-22 RX ORDER — FERROUS SULFATE 325(65) MG
325 TABLET ORAL
Qty: 90 TABLET | Refills: 3 | Status: SHIPPED | OUTPATIENT
Start: 2024-07-22

## 2024-07-22 RX ORDER — ATORVASTATIN CALCIUM 10 MG/1
10 TABLET, FILM COATED ORAL DAILY
Qty: 90 TABLET | Refills: 3 | Status: SHIPPED | OUTPATIENT
Start: 2024-07-22

## 2024-07-22 ASSESSMENT — ENCOUNTER SYMPTOMS
COUGH: 0
ABDOMINAL PAIN: 0
SHORTNESS OF BREATH: 0

## 2024-07-22 NOTE — PROGRESS NOTES
Well Adult Note  Name: Wendy Gutierrez Today’s Date: 2024   MRN: [id number] Sex: Female   Age: 48 y.o. Ethnicity: Non- / Non    : 1975 Race: Black / African American      Wendy Gutierrez is here for a well adult exam.       Subjective   History:  Cpx      Review of Systems   Constitutional:  Negative for chills and fever.   Respiratory:  Negative for cough and shortness of breath.    Cardiovascular:  Negative for chest pain.   Gastrointestinal:  Negative for abdominal pain.   Endocrine: Negative for cold intolerance and heat intolerance.   Genitourinary:  Negative for difficulty urinating, frequency, hematuria, pelvic pain, vaginal bleeding, vaginal discharge and vaginal pain.   Neurological:  Negative for headaches.   Psychiatric/Behavioral: Negative.         No Known Allergies  Prior to Visit Medications    Medication Sig Taking? Authorizing Provider   Estradiol-Estriol-Progesterone (BI-EST 80:20 PROGESTERONE) CREA  Yes Sabina Boss MD   Estradiol-Estriol-Progesterone (BIEST/PROGESTERONE TD) Apply 2 Pump topically daily W-Biest 50/50 Prog Cream Yes ProviderSabina MD   atorvastatin (LIPITOR) 10 MG tablet Take 1 tablet by mouth daily Yes Liban Jose, DO   ferrous sulfate (IRON 325) 325 (65 Fe) MG tablet Take 1 tablet by mouth daily (with breakfast) Yes Liban Jose DO   Probiotic Product (CULTURELLE PROBIOTICS) CHEW  Yes ProviderSabina MD   vitamin D 25 MCG (1000 UT) CAPS Take 1 capsule by mouth daily Yes Automatic Reconciliation, Ar     Past Medical History:   Diagnosis Date    Acne     Gestational diabetes     Hypertension     Low testosterone level in female     Menopausal syndrome     Mixed hyperlipidemia     Vitamin D deficiency 2013    [facility] (Level 13)     Past Surgical History:   Procedure Laterality Date    BREAST REDUCTION SURGERY Bilateral 2013     SECTION      6.12., 2010    COLONOSCOPY N/A

## 2024-07-22 NOTE — PATIENT INSTRUCTIONS
hands, brush your teeth twice a day, and wear a seat belt in the car.   Where can you learn more?  Go to [email or web address] and enter P072 to learn more about \"Well Visit, Ages 18 to 65: Care Instructions.\"  Current as of: August 6, 2023  Content Version: 14.1  © 5137-1019 [employer], [employer].   Care instructions adapted under license by [employer]. If you have questions about a medical condition or this instruction, always ask your healthcare professional. [employer], [employer] disclaims any warranty or liability for your use of this information.

## 2024-08-27 SDOH — ECONOMIC STABILITY: FOOD INSECURITY: WITHIN THE PAST 12 MONTHS, YOU WORRIED THAT YOUR FOOD WOULD RUN OUT BEFORE YOU GOT MONEY TO BUY MORE.: NEVER TRUE

## 2024-08-27 SDOH — ECONOMIC STABILITY: INCOME INSECURITY: HOW HARD IS IT FOR YOU TO PAY FOR THE VERY BASICS LIKE FOOD, HOUSING, MEDICAL CARE, AND HEATING?: NOT HARD AT ALL

## 2024-08-27 SDOH — ECONOMIC STABILITY: FOOD INSECURITY: WITHIN THE PAST 12 MONTHS, THE FOOD YOU BOUGHT JUST DIDN'T LAST AND YOU DIDN'T HAVE MONEY TO GET MORE.: NEVER TRUE

## 2024-08-27 SDOH — ECONOMIC STABILITY: TRANSPORTATION INSECURITY
IN THE PAST 12 MONTHS, HAS LACK OF TRANSPORTATION KEPT YOU FROM MEETINGS, WORK, OR FROM GETTING THINGS NEEDED FOR DAILY LIVING?: NO

## 2024-08-30 ENCOUNTER — OFFICE VISIT (OUTPATIENT)
Dept: OBGYN CLINIC | Age: 49
End: 2024-08-30
Payer: OTHER GOVERNMENT

## 2024-08-30 VITALS
DIASTOLIC BLOOD PRESSURE: 84 MMHG | SYSTOLIC BLOOD PRESSURE: 124 MMHG | BODY MASS INDEX: 27.82 KG/M2 | WEIGHT: 167 LBS | HEIGHT: 65 IN

## 2024-08-30 DIAGNOSIS — Z01.419 WELL WOMAN EXAM WITH ROUTINE GYNECOLOGICAL EXAM: Primary | ICD-10-CM

## 2024-08-30 DIAGNOSIS — Z12.4 SCREENING FOR CERVICAL CANCER: ICD-10-CM

## 2024-08-30 DIAGNOSIS — Z11.51 SCREENING FOR HUMAN PAPILLOMAVIRUS (HPV): ICD-10-CM

## 2024-08-30 DIAGNOSIS — Z12.31 SCREENING MAMMOGRAM FOR BREAST CANCER: ICD-10-CM

## 2024-08-30 PROCEDURE — 3074F SYST BP LT 130 MM HG: CPT | Performed by: OBSTETRICS & GYNECOLOGY

## 2024-08-30 PROCEDURE — 3079F DIAST BP 80-89 MM HG: CPT | Performed by: OBSTETRICS & GYNECOLOGY

## 2024-08-30 PROCEDURE — 99396 PREV VISIT EST AGE 40-64: CPT | Performed by: OBSTETRICS & GYNECOLOGY

## 2024-08-30 RX ORDER — ANTIARTHRITIC COMBINATION NO.2 900 MG
TABLET ORAL
COMMUNITY
Start: 2024-07-18

## 2024-09-05 LAB
COLLECTION METHOD: NORMAL
CYTOLOGIST CVX/VAG CYTO: NORMAL
CYTOLOGY CVX/VAG DOC THIN PREP: NORMAL
DATE OF LMP: NORMAL
HPV APTIMA: NEGATIVE
HPV GENOTYPE REFLEX: NORMAL
Lab: NORMAL
OTHER PT INFO: NORMAL
PAP SOURCE: NORMAL
PATH REPORT.FINAL DX SPEC: NORMAL
PREV CYTO INFO: NEGATIVE
PREV TREATMENT RESULTS: NORMAL
PREV TREATMENT: NORMAL
STAT OF ADQ CVX/VAG CYTO-IMP: NORMAL

## 2024-10-11 ENCOUNTER — HOSPITAL ENCOUNTER (OUTPATIENT)
Dept: MAMMOGRAPHY | Age: 49
Discharge: HOME | End: 2024-10-14
Attending: OBSTETRICS & GYNECOLOGY
Payer: OTHER GOVERNMENT

## 2024-10-11 DIAGNOSIS — Z01.419 WELL WOMAN EXAM WITH ROUTINE GYNECOLOGICAL EXAM: ICD-10-CM

## 2024-10-11 DIAGNOSIS — Z12.31 SCREENING MAMMOGRAM FOR BREAST CANCER: ICD-10-CM

## 2024-10-11 PROCEDURE — 77063 BREAST TOMOSYNTHESIS BI: CPT

## 2025-07-18 DIAGNOSIS — D50.9 IRON DEFICIENCY ANEMIA, UNSPECIFIED IRON DEFICIENCY ANEMIA TYPE: ICD-10-CM

## 2025-07-18 DIAGNOSIS — I10 ESSENTIAL (PRIMARY) HYPERTENSION: ICD-10-CM

## 2025-07-18 DIAGNOSIS — E55.9 VITAMIN D DEFICIENCY: ICD-10-CM

## 2025-07-18 DIAGNOSIS — Z00.00 LABORATORY EXAMINATION ORDERED AS PART OF A ROUTINE GENERAL MEDICAL EXAMINATION: Primary | ICD-10-CM

## 2025-07-18 DIAGNOSIS — E78.2 MIXED HYPERLIPIDEMIA: ICD-10-CM

## 2025-07-25 DIAGNOSIS — R79.89 LOW TESTOSTERONE LEVEL IN FEMALE: Primary | ICD-10-CM

## 2025-07-25 DIAGNOSIS — N95.1 MENOPAUSAL SYNDROME: ICD-10-CM

## 2025-07-29 ENCOUNTER — LAB (OUTPATIENT)
Dept: FAMILY MEDICINE CLINIC | Facility: CLINIC | Age: 50
End: 2025-07-29
Payer: OTHER GOVERNMENT

## 2025-07-29 DIAGNOSIS — R79.89 LOW TESTOSTERONE LEVEL IN FEMALE: ICD-10-CM

## 2025-07-29 DIAGNOSIS — Z00.00 LABORATORY EXAMINATION ORDERED AS PART OF A ROUTINE GENERAL MEDICAL EXAMINATION: Primary | ICD-10-CM

## 2025-07-29 DIAGNOSIS — N95.1 MENOPAUSAL SYNDROME: ICD-10-CM

## 2025-07-29 DIAGNOSIS — E55.9 VITAMIN D DEFICIENCY: ICD-10-CM

## 2025-07-29 DIAGNOSIS — E78.2 MIXED HYPERLIPIDEMIA: ICD-10-CM

## 2025-07-29 LAB
25(OH)D3 SERPL-MCNC: 65.1 NG/ML (ref 30–100)
ALBUMIN SERPL-MCNC: 3.9 G/DL (ref 3.5–5)
ALBUMIN/GLOB SERPL: 1.3 (ref 1–1.9)
ALP SERPL-CCNC: 48 U/L (ref 35–104)
ALT SERPL-CCNC: 15 U/L (ref 8–45)
ANION GAP SERPL CALC-SCNC: 9 MMOL/L (ref 7–16)
AST SERPL-CCNC: 23 U/L (ref 15–37)
BILIRUB SERPL-MCNC: 0.6 MG/DL (ref 0–1.2)
BILIRUBIN, URINE, POC: NEGATIVE
BLOOD URINE, POC: NEGATIVE
BUN SERPL-MCNC: 11 MG/DL (ref 6–23)
CALCIUM SERPL-MCNC: 10.1 MG/DL (ref 8.8–10.2)
CHLORIDE SERPL-SCNC: 106 MMOL/L (ref 98–107)
CHOLEST SERPL-MCNC: 213 MG/DL (ref 0–200)
CO2 SERPL-SCNC: 26 MMOL/L (ref 20–29)
CREAT SERPL-MCNC: 1.3 MG/DL (ref 0.6–1.1)
GLOBULIN SER CALC-MCNC: 3 G/DL (ref 2.3–3.5)
GLUCOSE SERPL-MCNC: 106 MG/DL (ref 70–99)
GLUCOSE URINE, POC: NEGATIVE
GRANS ABSOLUTE, POC: 2.7 K/UL
GRANULOCYTES %, POC: 43.5 %
HDLC SERPL-MCNC: 81 MG/DL (ref 40–60)
HDLC SERPL: 2.6 (ref 0–5)
HEMATOCRIT, POC: 42.2 %
HEMOGLOBIN, POC: 14.4 G/DL
KETONES, URINE, POC: NEGATIVE
LDLC SERPL CALC-MCNC: 113 MG/DL (ref 0–100)
LEUKOCYTE ESTERASE, URINE, POC: NEGATIVE
LYMPHOCYTE %, POC: 47.7 %
LYMPHS ABSOLUTE, POC: 3 K/UL
MCH, POC: NORMAL PG (ref 40–?)
MCHC, POC: 34.1
MCV, POC: 92.7
MONOCYTE %, POC: 8.8 %
MONOCYTE, ABSOLUTE POC: 0.6 K/UL
MPV, POC: 9.7 FL
NITRITE, URINE, POC: NEGATIVE
PH, URINE, POC: 5.5 (ref 4.6–8)
PLATELET COUNT, POC: 204 K/UL
POTASSIUM SERPL-SCNC: 3.9 MMOL/L (ref 3.5–5.1)
PROGEST SERPL-MCNC: 16.1 NG/ML
PROT SERPL-MCNC: 6.9 G/DL (ref 6.3–8.2)
PROTEIN,URINE, POC: NEGATIVE
RBC, POC: 4.55 M/UL
RDW, POC: 13.4 %
SODIUM SERPL-SCNC: 142 MMOL/L (ref 136–145)
SPECIFIC GRAVITY, URINE, POC: 1.01 (ref 1–1.03)
TRIGL SERPL-MCNC: 98 MG/DL (ref 0–150)
TSH, 3RD GENERATION: 2.65 UIU/ML (ref 0.27–4.2)
URINALYSIS CLARITY, POC: CLEAR
URINALYSIS COLOR, POC: YELLOW
UROBILINOGEN, POC: NORMAL
VLDLC SERPL CALC-MCNC: 20 MG/DL (ref 6–23)
WBC, POC: 6.3 K/UL

## 2025-07-29 PROCEDURE — 81003 URINALYSIS AUTO W/O SCOPE: CPT | Performed by: FAMILY MEDICINE

## 2025-07-29 PROCEDURE — 85025 COMPLETE CBC W/AUTO DIFF WBC: CPT | Performed by: FAMILY MEDICINE

## 2025-07-30 LAB
ESTRADIOL SERPL-MCNC: 302 PG/ML
ESTRONE SERPL-MCNC: 115 PG/ML

## 2025-07-31 LAB
TESTOST FREE SERPL-MCNC: 0.7 PG/ML (ref 0–4.2)
TESTOST SERPL-MCNC: 16 NG/DL (ref 4–50)

## 2025-08-01 ASSESSMENT — PATIENT HEALTH QUESTIONNAIRE - PHQ9
1. LITTLE INTEREST OR PLEASURE IN DOING THINGS: NOT AT ALL
SUM OF ALL RESPONSES TO PHQ QUESTIONS 1-9: 0
SUM OF ALL RESPONSES TO PHQ QUESTIONS 1-9: 0
1. LITTLE INTEREST OR PLEASURE IN DOING THINGS: NOT AT ALL
2. FEELING DOWN, DEPRESSED OR HOPELESS: NOT AT ALL
SUM OF ALL RESPONSES TO PHQ QUESTIONS 1-9: 0
SUM OF ALL RESPONSES TO PHQ9 QUESTIONS 1 & 2: 0
SUM OF ALL RESPONSES TO PHQ QUESTIONS 1-9: 0
2. FEELING DOWN, DEPRESSED OR HOPELESS: NOT AT ALL

## 2025-08-04 ENCOUNTER — OFFICE VISIT (OUTPATIENT)
Dept: FAMILY MEDICINE CLINIC | Facility: CLINIC | Age: 50
End: 2025-08-04
Payer: OTHER GOVERNMENT

## 2025-08-04 VITALS
SYSTOLIC BLOOD PRESSURE: 110 MMHG | BODY MASS INDEX: 28.16 KG/M2 | DIASTOLIC BLOOD PRESSURE: 70 MMHG | HEART RATE: 62 BPM | HEIGHT: 65 IN | WEIGHT: 169 LBS

## 2025-08-04 DIAGNOSIS — L30.9 ECZEMA, UNSPECIFIED TYPE: ICD-10-CM

## 2025-08-04 DIAGNOSIS — Z00.00 ROUTINE GENERAL MEDICAL EXAMINATION AT A HEALTH CARE FACILITY: Primary | ICD-10-CM

## 2025-08-04 DIAGNOSIS — Z13.31 SCREENING FOR DEPRESSION: ICD-10-CM

## 2025-08-04 DIAGNOSIS — D50.9 IRON DEFICIENCY ANEMIA, UNSPECIFIED IRON DEFICIENCY ANEMIA TYPE: ICD-10-CM

## 2025-08-04 DIAGNOSIS — E78.2 MIXED HYPERLIPIDEMIA: ICD-10-CM

## 2025-08-04 PROCEDURE — 3074F SYST BP LT 130 MM HG: CPT | Performed by: FAMILY MEDICINE

## 2025-08-04 PROCEDURE — 99396 PREV VISIT EST AGE 40-64: CPT | Performed by: FAMILY MEDICINE

## 2025-08-04 PROCEDURE — 3078F DIAST BP <80 MM HG: CPT | Performed by: FAMILY MEDICINE

## 2025-08-04 PROCEDURE — 99213 OFFICE O/P EST LOW 20 MIN: CPT | Performed by: FAMILY MEDICINE

## 2025-08-04 RX ORDER — MOMETASONE FUROATE 1 MG/G
CREAM TOPICAL
Qty: 60 G | Refills: 3 | Status: SHIPPED | OUTPATIENT
Start: 2025-08-04

## 2025-08-04 RX ORDER — FERROUS SULFATE 325(65) MG
325 TABLET ORAL
Qty: 90 TABLET | Refills: 3 | Status: SHIPPED | OUTPATIENT
Start: 2025-08-04

## 2025-08-04 RX ORDER — ATORVASTATIN CALCIUM 10 MG/1
10 TABLET, FILM COATED ORAL DAILY
Qty: 90 TABLET | Refills: 3 | Status: SHIPPED | OUTPATIENT
Start: 2025-08-04

## 2025-08-04 SDOH — ECONOMIC STABILITY: FOOD INSECURITY: WITHIN THE PAST 12 MONTHS, YOU WORRIED THAT YOUR FOOD WOULD RUN OUT BEFORE YOU GOT MONEY TO BUY MORE.: NEVER TRUE

## 2025-08-04 SDOH — ECONOMIC STABILITY: FOOD INSECURITY: WITHIN THE PAST 12 MONTHS, THE FOOD YOU BOUGHT JUST DIDN'T LAST AND YOU DIDN'T HAVE MONEY TO GET MORE.: NEVER TRUE

## 2025-08-04 ASSESSMENT — ENCOUNTER SYMPTOMS
ABDOMINAL PAIN: 0
COUGH: 0
SHORTNESS OF BREATH: 0

## (undated) DEVICE — CANNULA NSL ORAL AD FOR CAPNOFLEX CO2 O2 AIRLFE

## (undated) DEVICE — SYRINGE MED 3ML CLR PLAS STD N CTRL LUERLOCK TIP DISP

## (undated) DEVICE — CONNECTOR TBNG OD5-7MM O2 END DISP

## (undated) DEVICE — GLOVE SURG SZ 75 CRM LTX FREE POLYISOPRENE POLYMER BEAD ANTI

## (undated) DEVICE — SYRINGE MED 5ML STD CLR PLAS LUERLOCK TIP N CTRL DISP

## (undated) DEVICE — NEEDLE SYR 18GA L1.5IN RED PLAS HUB S STL BLNT FILL W/O

## (undated) DEVICE — KENDALL RADIOLUCENT FOAM MONITORING ELECTRODE RECTANGULAR SHAPE: Brand: KENDALL